# Patient Record
Sex: FEMALE | Race: WHITE | Employment: OTHER | URBAN - METROPOLITAN AREA
[De-identification: names, ages, dates, MRNs, and addresses within clinical notes are randomized per-mention and may not be internally consistent; named-entity substitution may affect disease eponyms.]

---

## 2018-03-26 RX ORDER — LISINOPRIL 2.5 MG/1
2.5 TABLET ORAL DAILY
Qty: 90 TABLET | Refills: 3 | Status: SHIPPED | OUTPATIENT
Start: 2018-03-26 | End: 2019-05-01 | Stop reason: SDUPTHER

## 2018-06-18 ENCOUNTER — OFFICE VISIT (OUTPATIENT)
Dept: CARDIOLOGY CLINIC | Age: 78
End: 2018-06-18
Payer: MEDICARE

## 2018-06-18 VITALS
BODY MASS INDEX: 29.03 KG/M2 | SYSTOLIC BLOOD PRESSURE: 110 MMHG | WEIGHT: 185 LBS | HEIGHT: 67 IN | DIASTOLIC BLOOD PRESSURE: 60 MMHG | HEART RATE: 60 BPM

## 2018-06-18 DIAGNOSIS — I34.0 NON-RHEUMATIC MITRAL REGURGITATION: ICD-10-CM

## 2018-06-18 DIAGNOSIS — E78.5 DYSLIPIDEMIA: ICD-10-CM

## 2018-06-18 DIAGNOSIS — Z78.9 STATIN INTOLERANCE: ICD-10-CM

## 2018-06-18 DIAGNOSIS — Z86.718 HISTORY OF RECURRENT DEEP VEIN THROMBOSIS (DVT): ICD-10-CM

## 2018-06-18 DIAGNOSIS — I10 ESSENTIAL HYPERTENSION: ICD-10-CM

## 2018-06-18 DIAGNOSIS — R07.89 OTHER CHEST PAIN: ICD-10-CM

## 2018-06-18 DIAGNOSIS — E66.3 OVER WEIGHT: ICD-10-CM

## 2018-06-18 DIAGNOSIS — I25.10 CORONARY ARTERY DISEASE INVOLVING NATIVE CORONARY ARTERY OF NATIVE HEART WITHOUT ANGINA PECTORIS: Primary | ICD-10-CM

## 2018-06-18 PROCEDURE — 99214 OFFICE O/P EST MOD 30 MIN: CPT | Performed by: INTERNAL MEDICINE

## 2018-06-18 PROCEDURE — 93000 ELECTROCARDIOGRAM COMPLETE: CPT | Performed by: INTERNAL MEDICINE

## 2018-06-18 RX ORDER — CARVEDILOL 3.12 MG/1
3.12 TABLET ORAL 2 TIMES DAILY WITH MEALS
Qty: 180 TABLET | Refills: 3 | Status: SHIPPED | OUTPATIENT
Start: 2018-06-18 | End: 2019-07-08 | Stop reason: SDUPTHER

## 2018-12-17 ENCOUNTER — OFFICE VISIT (OUTPATIENT)
Dept: CARDIOLOGY CLINIC | Age: 78
End: 2018-12-17
Payer: MEDICARE

## 2018-12-17 VITALS
HEART RATE: 62 BPM | WEIGHT: 188 LBS | BODY MASS INDEX: 29.51 KG/M2 | HEIGHT: 67 IN | DIASTOLIC BLOOD PRESSURE: 60 MMHG | SYSTOLIC BLOOD PRESSURE: 120 MMHG

## 2018-12-17 DIAGNOSIS — I25.10 CORONARY ARTERY DISEASE INVOLVING NATIVE CORONARY ARTERY OF NATIVE HEART WITHOUT ANGINA PECTORIS: Primary | ICD-10-CM

## 2018-12-17 DIAGNOSIS — Z86.718 HISTORY OF RECURRENT DEEP VEIN THROMBOSIS (DVT): ICD-10-CM

## 2018-12-17 DIAGNOSIS — I10 ESSENTIAL HYPERTENSION: ICD-10-CM

## 2018-12-17 DIAGNOSIS — E66.3 OVER WEIGHT: ICD-10-CM

## 2018-12-17 DIAGNOSIS — Z78.9 STATIN INTOLERANCE: ICD-10-CM

## 2018-12-17 DIAGNOSIS — E78.5 DYSLIPIDEMIA: ICD-10-CM

## 2018-12-17 DIAGNOSIS — I34.0 NON-RHEUMATIC MITRAL REGURGITATION: ICD-10-CM

## 2018-12-17 PROCEDURE — 93000 ELECTROCARDIOGRAM COMPLETE: CPT | Performed by: INTERNAL MEDICINE

## 2018-12-17 PROCEDURE — 99215 OFFICE O/P EST HI 40 MIN: CPT | Performed by: INTERNAL MEDICINE

## 2018-12-17 RX ORDER — ICOSAPENT ETHYL 1000 MG/1
2 CAPSULE ORAL 2 TIMES DAILY
Qty: 120 CAPSULE | Refills: 6 | Status: SHIPPED | OUTPATIENT
Start: 2018-12-17 | End: 2018-12-17 | Stop reason: SDUPTHER

## 2018-12-17 RX ORDER — ICOSAPENT ETHYL 1000 MG/1
2 CAPSULE ORAL 2 TIMES DAILY
Qty: 32 CAPSULE | Refills: 0 | COMMUNITY
Start: 2018-12-17 | End: 2020-08-03

## 2019-05-01 RX ORDER — LISINOPRIL 2.5 MG/1
2.5 TABLET ORAL DAILY
Qty: 90 TABLET | Refills: 3 | Status: SHIPPED
Start: 2019-05-01 | End: 2020-04-29 | Stop reason: SDUPTHER

## 2019-05-08 ENCOUNTER — HOSPITAL ENCOUNTER (OUTPATIENT)
Age: 79
Discharge: HOME | End: 2019-05-10

## 2019-05-08 PROCEDURE — 88300 SURGICAL PATH GROSS: CPT

## 2019-07-01 ENCOUNTER — HOSPITAL ENCOUNTER (OUTPATIENT)
Age: 79
Discharge: HOME | End: 2019-07-03
Payer: MEDICARE

## 2019-07-01 LAB
ALBUMIN SERPL-MCNC: 4.4 G/DL (ref 3.5–5.2)
ALP BLD-CCNC: 61 U/L (ref 35–104)
ALT SERPL-CCNC: 14 U/L (ref 0–32)
ANION GAP SERPL CALCULATED.3IONS-SCNC: 13 MMOL/L (ref 7–16)
AST SERPL-CCNC: 19 U/L (ref 0–31)
BASOPHILS ABSOLUTE: 0.05 E9/L (ref 0–0.2)
BASOPHILS RELATIVE PERCENT: 1.2 % (ref 0–2)
BILIRUB SERPL-MCNC: 1 MG/DL (ref 0–1.2)
BUN BLDV-MCNC: 23 MG/DL (ref 8–23)
CALCIUM SERPL-MCNC: 9.5 MG/DL (ref 8.6–10.2)
CHLORIDE BLD-SCNC: 105 MMOL/L (ref 98–107)
CHOLESTEROL, TOTAL: 127 MG/DL (ref 0–199)
CO2: 23 MMOL/L (ref 22–29)
CREAT SERPL-MCNC: 1 MG/DL (ref 0.5–1)
EOSINOPHILS ABSOLUTE: 0.06 E9/L (ref 0.05–0.5)
EOSINOPHILS RELATIVE PERCENT: 1.5 % (ref 0–6)
GFR AFRICAN AMERICAN: >60
GFR NON-AFRICAN AMERICAN: 53 ML/MIN/1.73
GLUCOSE BLD-MCNC: 114 MG/DL (ref 74–99)
HBA1C MFR BLD: 6.2 % (ref 4–5.6)
HCT VFR BLD CALC: 35.5 % (ref 34–48)
HDLC SERPL-MCNC: 37 MG/DL
HEMOGLOBIN: 11.6 G/DL (ref 11.5–15.5)
IMMATURE GRANULOCYTES #: 0.02 E9/L
IMMATURE GRANULOCYTES %: 0.5 % (ref 0–5)
LDL CHOLESTEROL CALCULATED: 71 MG/DL (ref 0–99)
LYMPHOCYTES ABSOLUTE: 1.07 E9/L (ref 1.5–4)
LYMPHOCYTES RELATIVE PERCENT: 26.4 % (ref 20–42)
MCH RBC QN AUTO: 29.2 PG (ref 26–35)
MCHC RBC AUTO-ENTMCNC: 32.7 % (ref 32–34.5)
MCV RBC AUTO: 89.4 FL (ref 80–99.9)
MONOCYTES ABSOLUTE: 0.35 E9/L (ref 0.1–0.95)
MONOCYTES RELATIVE PERCENT: 8.6 % (ref 2–12)
NEUTROPHILS ABSOLUTE: 2.51 E9/L (ref 1.8–7.3)
NEUTROPHILS RELATIVE PERCENT: 61.8 % (ref 43–80)
PDW BLD-RTO: 14.9 FL (ref 11.5–15)
PLATELET # BLD: 131 E9/L (ref 130–450)
PMV BLD AUTO: 11.7 FL (ref 7–12)
POTASSIUM SERPL-SCNC: 4.5 MMOL/L (ref 3.5–5)
RBC # BLD: 3.97 E12/L (ref 3.5–5.5)
SODIUM BLD-SCNC: 141 MMOL/L (ref 132–146)
TOTAL PROTEIN: 7.1 G/DL (ref 6.4–8.3)
TRIGL SERPL-MCNC: 93 MG/DL (ref 0–149)
TSH SERPL DL<=0.05 MIU/L-ACNC: 3.05 UIU/ML (ref 0.27–4.2)
VLDLC SERPL CALC-MCNC: 19 MG/DL
WBC # BLD: 4.1 E9/L (ref 4.5–11.5)

## 2019-07-01 PROCEDURE — 84443 ASSAY THYROID STIM HORMONE: CPT

## 2019-07-01 PROCEDURE — 85025 COMPLETE CBC W/AUTO DIFF WBC: CPT

## 2019-07-01 PROCEDURE — 80061 LIPID PANEL: CPT

## 2019-07-01 PROCEDURE — 83036 HEMOGLOBIN GLYCOSYLATED A1C: CPT

## 2019-07-01 PROCEDURE — 80053 COMPREHEN METABOLIC PANEL: CPT

## 2019-07-08 ENCOUNTER — OFFICE VISIT (OUTPATIENT)
Dept: CARDIOLOGY CLINIC | Age: 79
End: 2019-07-08
Payer: MEDICARE

## 2019-07-08 VITALS
WEIGHT: 192 LBS | SYSTOLIC BLOOD PRESSURE: 136 MMHG | HEIGHT: 67 IN | HEART RATE: 65 BPM | BODY MASS INDEX: 30.13 KG/M2 | DIASTOLIC BLOOD PRESSURE: 60 MMHG

## 2019-07-08 DIAGNOSIS — I10 ESSENTIAL HYPERTENSION: Primary | ICD-10-CM

## 2019-07-08 DIAGNOSIS — R06.02 SOBOE (SHORTNESS OF BREATH ON EXERTION): ICD-10-CM

## 2019-07-08 DIAGNOSIS — I34.0 NON-RHEUMATIC MITRAL REGURGITATION: ICD-10-CM

## 2019-07-08 DIAGNOSIS — E78.5 DYSLIPIDEMIA: ICD-10-CM

## 2019-07-08 DIAGNOSIS — R07.89 OTHER CHEST PAIN: ICD-10-CM

## 2019-07-08 DIAGNOSIS — R01.1 HEART MURMUR: ICD-10-CM

## 2019-07-08 DIAGNOSIS — I25.10 CORONARY ARTERY DISEASE INVOLVING NATIVE CORONARY ARTERY OF NATIVE HEART WITHOUT ANGINA PECTORIS: ICD-10-CM

## 2019-07-08 PROCEDURE — 93000 ELECTROCARDIOGRAM COMPLETE: CPT | Performed by: INTERNAL MEDICINE

## 2019-07-08 PROCEDURE — 99214 OFFICE O/P EST MOD 30 MIN: CPT | Performed by: INTERNAL MEDICINE

## 2019-07-08 RX ORDER — ATORVASTATIN CALCIUM 10 MG/1
TABLET, FILM COATED ORAL
Refills: 0 | COMMUNITY
Start: 2019-04-01 | End: 2019-07-08

## 2019-07-08 RX ORDER — CARVEDILOL 3.12 MG/1
3.12 TABLET ORAL 2 TIMES DAILY WITH MEALS
Qty: 180 TABLET | Refills: 3 | Status: SHIPPED
Start: 2019-07-08 | End: 2020-09-02

## 2019-07-08 RX ORDER — ATORVASTATIN CALCIUM 10 MG/1
10 TABLET, FILM COATED ORAL EVERY OTHER DAY
Qty: 45 TABLET | Refills: 5
Start: 2019-07-08 | End: 2021-04-23

## 2019-07-23 ENCOUNTER — HOSPITAL ENCOUNTER (OUTPATIENT)
Dept: CARDIOLOGY | Age: 79
Discharge: HOME | End: 2019-07-23
Payer: MEDICARE

## 2019-07-23 DIAGNOSIS — I25.10 CORONARY ARTERY DISEASE INVOLVING NATIVE CORONARY ARTERY OF NATIVE HEART WITHOUT ANGINA PECTORIS: ICD-10-CM

## 2019-07-23 DIAGNOSIS — I10 ESSENTIAL HYPERTENSION: ICD-10-CM

## 2019-07-23 DIAGNOSIS — R01.1 HEART MURMUR: ICD-10-CM

## 2019-07-23 DIAGNOSIS — R06.02 SOBOE (SHORTNESS OF BREATH ON EXERTION): ICD-10-CM

## 2019-07-23 LAB
LV EF: 70 %
LVEF MODALITY: NORMAL

## 2019-07-23 PROCEDURE — 93306 TTE W/DOPPLER COMPLETE: CPT

## 2019-07-29 ENCOUNTER — TELEPHONE (OUTPATIENT)
Dept: CARDIOLOGY CLINIC | Age: 79
End: 2019-07-29

## 2019-08-05 RX ORDER — ICOSAPENT ETHYL 1000 MG/1
CAPSULE ORAL
Qty: 120 CAPSULE | Refills: 6 | Status: SHIPPED
Start: 2019-08-05 | End: 2020-08-03

## 2020-03-11 ENCOUNTER — HOSPITAL ENCOUNTER (OUTPATIENT)
Age: 80
Discharge: HOME | End: 2020-03-13
Payer: MEDICARE

## 2020-03-11 LAB
ALBUMIN SERPL-MCNC: 4.6 G/DL (ref 3.5–5.2)
ALP BLD-CCNC: 63 U/L (ref 35–104)
ALT SERPL-CCNC: 20 U/L (ref 0–32)
ANION GAP SERPL CALCULATED.3IONS-SCNC: 13 MMOL/L (ref 7–16)
AST SERPL-CCNC: 23 U/L (ref 0–31)
BILIRUB SERPL-MCNC: 0.9 MG/DL (ref 0–1.2)
BUN BLDV-MCNC: 25 MG/DL (ref 8–23)
CALCIUM SERPL-MCNC: 9.9 MG/DL (ref 8.6–10.2)
CHLORIDE BLD-SCNC: 103 MMOL/L (ref 98–107)
CHOLESTEROL, TOTAL: 229 MG/DL (ref 0–199)
CO2: 24 MMOL/L (ref 22–29)
CREAT SERPL-MCNC: 0.9 MG/DL (ref 0.5–1)
GFR AFRICAN AMERICAN: >60
GFR NON-AFRICAN AMERICAN: >60 ML/MIN/1.73
GLUCOSE BLD-MCNC: 115 MG/DL (ref 74–99)
HBA1C MFR BLD: 5.9 % (ref 4–5.6)
HDLC SERPL-MCNC: 37 MG/DL
LDL CHOLESTEROL CALCULATED: 160 MG/DL (ref 0–99)
POTASSIUM SERPL-SCNC: 5 MMOL/L (ref 3.5–5)
SODIUM BLD-SCNC: 140 MMOL/L (ref 132–146)
TOTAL PROTEIN: 7.5 G/DL (ref 6.4–8.3)
TRIGL SERPL-MCNC: 162 MG/DL (ref 0–149)
VLDLC SERPL CALC-MCNC: 32 MG/DL

## 2020-03-11 PROCEDURE — 80061 LIPID PANEL: CPT

## 2020-03-11 PROCEDURE — 80053 COMPREHEN METABOLIC PANEL: CPT

## 2020-03-11 PROCEDURE — 83036 HEMOGLOBIN GLYCOSYLATED A1C: CPT

## 2020-04-30 RX ORDER — LISINOPRIL 2.5 MG/1
2.5 TABLET ORAL DAILY
Qty: 90 TABLET | Refills: 3 | Status: SHIPPED | OUTPATIENT
Start: 2020-04-30 | End: 2020-05-08 | Stop reason: SDUPTHER

## 2020-05-08 RX ORDER — LISINOPRIL 2.5 MG/1
2.5 TABLET ORAL DAILY
Qty: 90 TABLET | Refills: 3 | Status: SHIPPED
Start: 2020-05-08 | End: 2020-05-08 | Stop reason: SDUPTHER

## 2020-05-08 RX ORDER — LISINOPRIL 2.5 MG/1
2.5 TABLET ORAL DAILY
Qty: 30 TABLET | Refills: 0 | Status: SHIPPED
Start: 2020-05-08 | End: 2020-06-01

## 2020-06-07 RX ORDER — LISINOPRIL 2.5 MG/1
TABLET ORAL
Qty: 90 TABLET | Refills: 3 | Status: SHIPPED
Start: 2020-06-07 | End: 2021-05-19 | Stop reason: SDUPTHER

## 2020-07-29 ENCOUNTER — HOSPITAL ENCOUNTER (OUTPATIENT)
Age: 80
Discharge: HOME | End: 2020-07-31
Payer: MEDICARE

## 2020-07-29 LAB
ALBUMIN SERPL-MCNC: 4.2 G/DL (ref 3.5–5.2)
ALP BLD-CCNC: 63 U/L (ref 35–104)
ALT SERPL-CCNC: 14 U/L (ref 0–32)
ANION GAP SERPL CALCULATED.3IONS-SCNC: 12 MMOL/L (ref 7–16)
AST SERPL-CCNC: 18 U/L (ref 0–31)
BILIRUB SERPL-MCNC: 1.3 MG/DL (ref 0–1.2)
BUN BLDV-MCNC: 30 MG/DL (ref 8–23)
CALCIUM SERPL-MCNC: 9.5 MG/DL (ref 8.6–10.2)
CHLORIDE BLD-SCNC: 102 MMOL/L (ref 98–107)
CHOLESTEROL, TOTAL: 203 MG/DL (ref 0–199)
CO2: 25 MMOL/L (ref 22–29)
CREAT SERPL-MCNC: 1.3 MG/DL (ref 0.5–1)
GFR AFRICAN AMERICAN: 48
GFR NON-AFRICAN AMERICAN: 39 ML/MIN/1.73
GLUCOSE BLD-MCNC: 108 MG/DL (ref 74–99)
HBA1C MFR BLD: 6.1 % (ref 4–5.6)
HDLC SERPL-MCNC: 33 MG/DL
LDL CHOLESTEROL CALCULATED: 139 MG/DL (ref 0–99)
POTASSIUM SERPL-SCNC: 4.6 MMOL/L (ref 3.5–5)
SODIUM BLD-SCNC: 139 MMOL/L (ref 132–146)
TOTAL PROTEIN: 7.1 G/DL (ref 6.4–8.3)
TRIGL SERPL-MCNC: 156 MG/DL (ref 0–149)
VLDLC SERPL CALC-MCNC: 31 MG/DL

## 2020-07-29 PROCEDURE — 83036 HEMOGLOBIN GLYCOSYLATED A1C: CPT

## 2020-07-29 PROCEDURE — 80061 LIPID PANEL: CPT

## 2020-07-29 PROCEDURE — 80053 COMPREHEN METABOLIC PANEL: CPT

## 2020-08-03 ENCOUNTER — OFFICE VISIT (OUTPATIENT)
Dept: CARDIOLOGY CLINIC | Age: 80
End: 2020-08-03
Payer: MEDICARE

## 2020-08-03 VITALS
HEIGHT: 67 IN | DIASTOLIC BLOOD PRESSURE: 60 MMHG | HEART RATE: 60 BPM | BODY MASS INDEX: 28.25 KG/M2 | WEIGHT: 180 LBS | SYSTOLIC BLOOD PRESSURE: 120 MMHG

## 2020-08-03 PROCEDURE — 99214 OFFICE O/P EST MOD 30 MIN: CPT | Performed by: INTERNAL MEDICINE

## 2020-08-03 PROCEDURE — 93000 ELECTROCARDIOGRAM COMPLETE: CPT | Performed by: INTERNAL MEDICINE

## 2020-08-03 NOTE — PROGRESS NOTES
OFFICE VISIT     PRIMARY CARE PHYSICIAN:      Mechelle Chau DO       ALLERGIES / SENSITIVITIES:        Allergies   Allergen Reactions    Tape Julisa Romance Tape]      BANDAIDS          REVIEWED MEDICATIONS:        Current Outpatient Medications:     lisinopril (PRINIVIL;ZESTRIL) 2.5 MG tablet, TAKE 1 TABLET BY MOUTH EVERY DAY, Disp: 90 tablet, Rfl: 3    carvedilol (COREG) 3.125 MG tablet, Take 1 tablet by mouth 2 times daily (with meals), Disp: 180 tablet, Rfl: 3    Docusate Calcium (STOOL SOFTENER PO), Take by mouth as needed, Disp: , Rfl:     nitroGLYCERIN (NITROSTAT) 0.4 MG SL tablet, Place 1 tablet under the tongue every 5 minutes as needed for Chest pain, Disp: 25 tablet, Rfl: 2    metFORMIN (GLUCOPHAGE) 500 MG tablet, Take 500 mg by mouth daily, Disp: , Rfl:     Fesoterodine Fumarate ER (TOVIAZ) 8 MG TB24, Take  by mouth daily. , Disp: , Rfl:     aspirin 325 MG tablet, Take 325 mg by mouth daily. LAST DOSE 5/28/12 , Disp: , Rfl:     atorvastatin (LIPITOR) 10 MG tablet, Take 1 tablet by mouth every other day (Patient not taking: Reported on 8/3/2020), Disp: 45 tablet, Rfl: 5      S: REASON FOR VISIT:       Chief Complaint   Patient presents with    Coronary Artery Disease     6 month follow-up-c/o SOB with activity and feels like she has a lump in her throat for \"quite a while\"-had thyroid ultrasound at PCP and labs last week-no hospitalizations          History of Present Illness:       Office Visit for follow up of CAD, HTN, SOB   [de-identified] yr pt with HX of CAD, HTN came for f/u ivisit   C/o SOB on exertion, chronic, no worse, No PND, sleep on 1 piilow   She had 'lump in throat',  And had U/S of her Thyroid   No hospitalizations or surgeries since last visit   Patient is compliant with all medications   Albert any exertional chest pain    No palpitations, dizzy or syncope.    Active at home   No orthopnea   Try to watch diet          Past Medical History:   Diagnosis Date    Arthritis     OSTEO    CAD (coronary artery disease) 2007    STENTS    Diabetes mellitus ([facility] [address]     DVT [facility] thrombosis) ([facility] X3 1999    HX OF    GERD (gastroesophageal reflux disease)     Glaucoma     BEGINNING    Hyperlipidemia     Hypertension     Hypertension     Overactive bladder     NEURO STIM PART 1 PLACED            Past Surgical History:   Procedure Laterality Date    APPENDECTOMY      BLADDER IRRIGATION/INSTALLATION  june 2012    interstim stimulator put in bladder by dr Darnell Davis  2009   Claudio Peters  12/19/2012    Dr. Nona Coppola, Dr. Dick Yao for PTCA of Circ    CARDIAC CATHETERIZATION  12/19/12    CAD:  70_80% INSTENT STENOSIS OF PROIMAL LEFT CX. PATIENT STENT IN THE PROXIMAL RCA, MILD CAD NORMAL LV EF 60%. PCI WAS DONE.  CARPAL TUNNEL RELEASE      CHOLECYSTECTOMY      COLONOSCOPY      CORONARY ANGIOPLASTY  [id number]    SUCCESSFUL PCI OF CX;  SUCCESSFUL PCI OF RCA.  CORONARY ANGIOPLASTY WITH STENT PLACEMENT      DIAGNOSTIC CARDIAC CATH LAB PROCEDURE  8/18/10    HEART LAB-PROX. LEFT CX INSTENT STENOSIS 40% AND MID OBTUSE MARGINAL BRANCH 40% STENOSIS, PROXIMAL LAD 20% ; PATENT STENT IN THE PROX RCA. NORMAL LV 65% EF.  ENDOSCOPY, COLON, DIAGNOSTIC      FOOT SURGERY      bunions    JOINT REPLACEMENT  1999, 2010    T FRANKIE, BILAT    KNEE SURGERY      bilateral knee surgery    OTHER SURGICAL HISTORY  06/01/2012    placement of peripheral neurostim generator stage II    PTCA  12/19/12    PTCA TO THE FOCAL INSTENT RESTENOSIS IN THE PROXIMAL LEFT CX WITH ANGIOMAX THERAPY. .             Family History   Problem Relation Age of Onset    Cancer Mother           Social History     Tobacco Use    Smoking status: Never Smoker    Smokeless tobacco: Never Used   Substance Use Topics    Alcohol use: No     Comment: drinks decaf tea 1/2 cup         Review of Systems:  Constitutional: negative for fever and chills, or significant weight loss  HEENT: negative for acute visual symptoms or auditory problems, no dysphagia  Respiratory: negative for cough, wheezing, or hemoptysis  Cardiovascular: negative for chest pain, palpitations, and +ve dyspnea  Gastrointestinal: negative for abdominal pain, diarrhea, nausea and vomiting  Endocrine: Negative for polyuria and polydyspsia  Genitourinary:negative for dysuria and hematuria  Derm: negative for rash and skin lesion(s)  Neurological: negative for tingling, numbness, weakness, seizures and tremors  Endocrine: negative for polydipsia and polyuria  Musculoskeletal: negative for pain or tenderness  Psychiatric: negative for anxiety, depression, or suicidal ideations         O:  COMPLETE PHYSICAL EXAM:       /60 (Site: Right Upper Arm, Position: Sitting, Cuff Size: Large Adult)   Pulse 60   Ht 5' 7\" (1.702 m)   Wt 180 lb (81.6 kg)   BMI 28.19 kg/m²       General:   Patient alert, comfortable, no distress. Appears stated age. HEENT:    Pupils equal, no icterus, no nasal drainage, tongue moist.   Neck:              No masses, Thyroid not palpable. No elevated JVD, No carotid bruit. Chest:   Normal configuration, non tender. Lungs:   Clear to auscultation bilaterally, few scattered rhonchi. Cardiovascular:  Regular rhythm, 1/6 systolic murmur, No S3   Abdomen:  Soft, Non tender, Bowel sounds normal, no pulsatile abdominal aorta, no palpable masses. Extremities:  Trace edema. Distal pulses palpable. No cyanosis, no clubbing. Skin:   Good turgor, warm and dry, no cyanosis. Musculoskeletal: No joint swelling or deformity. Neuro:   Cranial nerves grossly intact; No focal neurologic deficit. Psych:   Alert, good mood and effect. REVIEW OF DIAGNOSTIC TESTS:        Electrocardiogram: NSR   Echo -EF 70%, DD1, No VHD          A/P:   ASSESSMENT / PLAN:    Gifty Valerio was seen today for coronary artery disease.     Diagnoses and all orders for this visit:      Coronary artery disease involving native coronary artery of native heart without angina pectoris  - s/p Stent to RCA and Circumflex in 2007, s/p PCI of in-stent restenosis of Circ in 2012, On Aspirin, beta blockers, ACE-I/ARBs, On low dose Statins since Jan 2019 - Lipids better   -     EKG 12 Lead      SOBOE (shortness of breath on exertion) - Chronic, No VHD or acute CHF - LV EF 70%, No VHD. -     Tang Zee MD, Pulmonary, [facility]    Essential hypertension - Controlled  -     EKG 12 Lead      Dyslipidemia  - agreeable for Vascepa due to her high triglycerides (151), CAD and DM; Repatha/Praluent discussed if she can't tolerate Lipitor and LDL not at target     History of recurrent deep vein thrombosis (DVT) - On ASA 325mg DQ     Non-rheumatic mitral regurgitation - Stable     Over weight - Diet, exercise and weight loss discussed. Preventive Cardiology: Low cholesterol diet, regular exercise as tolerate, and gradual weight loss discussed. Monitor BP and heart rates. Above recommendations discussed with her. All questions answered about cardiac diagnoses and cardiac medications. Continue current medications. Compliance with medications and f/u with all physicians discussed. Risk factor modification based on risk profile discussed. Call if any exertional chest pain, short of breath, dizzy or palpitations   Follow up in 6-9 months or earlier if needed.          [facility]  [address] [facility], [address] [facility]  [phone]

## 2020-09-02 RX ORDER — CARVEDILOL 3.12 MG/1
TABLET ORAL
Qty: 180 TABLET | Refills: 3 | Status: SHIPPED
Start: 2020-09-02 | End: 2021-05-12 | Stop reason: SDUPTHER

## 2021-04-23 ENCOUNTER — OFFICE VISIT (OUTPATIENT)
Dept: CARDIOLOGY CLINIC | Age: 81
End: 2021-04-23
Payer: MEDICARE

## 2021-04-23 VITALS
RESPIRATION RATE: 16 BRPM | WEIGHT: 185 LBS | SYSTOLIC BLOOD PRESSURE: 128 MMHG | BODY MASS INDEX: 29.03 KG/M2 | OXYGEN SATURATION: 99 % | DIASTOLIC BLOOD PRESSURE: 70 MMHG | HEART RATE: 60 BPM | HEIGHT: 67 IN

## 2021-04-23 DIAGNOSIS — E78.5 DYSLIPIDEMIA: ICD-10-CM

## 2021-04-23 DIAGNOSIS — I25.10 CORONARY ARTERY DISEASE INVOLVING NATIVE CORONARY ARTERY OF NATIVE HEART WITHOUT ANGINA PECTORIS: Primary | ICD-10-CM

## 2021-04-23 PROBLEM — I34.0 NON-RHEUMATIC MITRAL REGURGITATION: Status: RESOLVED | Noted: 2018-12-17 | Resolved: 2021-04-23

## 2021-04-23 PROCEDURE — 99213 OFFICE O/P EST LOW 20 MIN: CPT | Performed by: INTERNAL MEDICINE

## 2021-04-23 PROCEDURE — 93000 ELECTROCARDIOGRAM COMPLETE: CPT | Performed by: INTERNAL MEDICINE

## 2021-04-23 RX ORDER — PANTOPRAZOLE SODIUM 40 MG/1
TABLET, DELAYED RELEASE ORAL
COMMUNITY
Start: 2021-04-09 | End: 2022-07-13 | Stop reason: SDUPTHER

## 2021-04-23 RX ORDER — PROPRANOLOL HYDROCHLORIDE 20 MG/1
20 TABLET ORAL 2 TIMES DAILY
COMMUNITY
Start: 2021-04-19

## 2021-04-23 NOTE — PROGRESS NOTES
Chief Complaint   Patient presents with    Coronary Artery Disease    Hyperlipidemia       Patient Active Problem List    Diagnosis Date Noted    Statin intolerance 06/18/2018    Essential hypertension 12/19/2016    History of recurrent deep vein thrombosis (DVT) 12/19/2016    Diabetes mellitus ([facility] [address] 07/29/2013    Dyslipidemia 07/29/2013    Coronary artery disease involving native coronary artery of native heart without angina pectoris 12/20/2012       Current Outpatient Medications   Medication Sig Dispense Refill    pantoprazole (PROTONIX) 40 MG tablet TAKE 1 TABLET BY MOUTH EVERY MORNING      propranolol (INDERAL) 20 MG tablet 20 mg 2 times daily       carvedilol (COREG) 3.125 MG tablet TAKE 1 TABLET TWICE A DAY  WITH MEALS 180 tablet 3    lisinopril (PRINIVIL;ZESTRIL) 2.5 MG tablet TAKE 1 TABLET BY MOUTH EVERY DAY 90 tablet 3    Docusate Calcium (STOOL SOFTENER PO) Take by mouth as needed      nitroGLYCERIN (NITROSTAT) 0.4 MG SL tablet Place 1 tablet under the tongue every 5 minutes as needed for Chest pain 25 tablet 2    metFORMIN (GLUCOPHAGE) 500 MG tablet Take 500 mg by mouth daily      Fesoterodine Fumarate ER (TOVIAZ) 8 MG TB24 Take  by mouth daily.  aspirin 325 MG tablet Take 325 mg by mouth daily. LAST DOSE 5/28/12        No current facility-administered medications for this visit. Allergies   Allergen Reactions    Tape [Adhesive Tape]      BANDAIDS       Vitals:    04/23/21 1034   BP: 128/70   Site: Right Upper Arm   Position: Sitting   Cuff Size: Medium Adult   Pulse: 60   Resp: 16   SpO2: 99%   Weight: 185 lb (83.9 kg)   Height: 5' 7\" (1.702 m)       Social History     Socioeconomic History    Marital status:       Spouse name: Not on file    Number of children: Not on file    Years of education: Not on file    Highest education level: Not on file   Occupational History    Not on file   Social Needs    Financial resource strain: Not on file    Food insecurity negative. Physical Exam   /70 (Site: Right Upper Arm, Position: Sitting, Cuff Size: Medium Adult)   Pulse 60   Resp 16   Ht 5' 7\" (1.702 m)   Wt 185 lb (83.9 kg)   SpO2 99%   BMI 28.98 kg/m²   Constitutional: Oriented to person, place, and time. Well-developed and well-nourished. No distress. Head: Normocephalic and atraumatic. Eyes: EOM are normal. Pupils are equal, round, and reactive to light. Neck: Normal range of motion. Neck supple. No hepatojugular reflux and no JVD present. Carotid bruit is not present. No tracheal deviation present. No thyromegaly present. Cardiovascular: Normal rate, regular rhythm, normal heart sounds and intact distal pulses. Exam reveals no gallop and no friction rub. No murmur heard. Pulmonary/Chest: Effort normal and breath sounds normal. No respiratory distress. No wheezes. No rales. No tenderness. Abdominal: Soft. Bowel sounds are normal. No distension and no mass. No tenderness. No rebound and no guarding. Musculoskeletal: Normal range of motion. No edema and no tenderness. Neurological: Alert and oriented to person, place, and time. Skin: Skin is warm and dry. No rash noted. Not diaphoretic. No erythema. Psychiatric: Normal mood and affect. Behavior is normal.     EKG:  normal sinus rhythm, 1st degree AV block, rate 60, , unchanged from previous tracings.     ASSESSMENT AND PLAN:  Patient Active Problem List   Diagnosis    Coronary artery disease involving native coronary artery of native heart without angina pectoris    Diabetes mellitus ([facility] [address]    Dyslipidemia    Essential hypertension    History of recurrent deep vein thrombosis (DVT)    Statin intolerance       Patient with hx of remote PCI in 2007 and again in 2012 for ISR  Last stress 2013 non ischemic  ekg and CV exam no change  No on statin - I have no records of her prior meds, but sounds like she had intolerance to two lipophilic statins - atorvastatin and simvastatin  If she has not been tried on HYDROPHILIC statins - pravastatin or rosuvastatin, she should not be labelled statin intolerant. I would ask PCP to pursue, as he would have records. If she has, or a trial of one them results in similar symptoms, she should be started on Repatha        The patient was educated as to the symptoms that would require a prompt return to our office.   Return visit in 1 year with DR Liam Mackey M.D  [facility]

## 2021-05-04 ENCOUNTER — OFFICE VISIT (OUTPATIENT)
Dept: PULMONOLOGY | Age: 81
End: 2021-05-04
Payer: MEDICARE

## 2021-05-04 VITALS
SYSTOLIC BLOOD PRESSURE: 131 MMHG | DIASTOLIC BLOOD PRESSURE: 64 MMHG | BODY MASS INDEX: 29.03 KG/M2 | HEART RATE: 67 BPM | HEIGHT: 67 IN | TEMPERATURE: 97.1 F | RESPIRATION RATE: 18 BRPM | OXYGEN SATURATION: 95 % | WEIGHT: 185 LBS

## 2021-05-04 DIAGNOSIS — J84.9 ILD (INTERSTITIAL LUNG DISEASE) (HCC): ICD-10-CM

## 2021-05-04 DIAGNOSIS — R06.02 SHORTNESS OF BREATH: Primary | ICD-10-CM

## 2021-05-04 DIAGNOSIS — M06.9 RHEUMATOID ARTHRITIS, INVOLVING UNSPECIFIED SITE, UNSPECIFIED WHETHER RHEUMATOID FACTOR PRESENT (HCC): ICD-10-CM

## 2021-05-04 LAB
EXPIRATORY TIME: NORMAL
FEF 25-75% %PRED-PRE: NORMAL
FEF 25-75% PRED: NORMAL
FEF 25-75%-PRE: NORMAL
FEV1 %PRED-PRE: 126 %
FEV1 PRED: 2.09 L
FEV1/FVC %PRED-PRE: 106 %
FEV1/FVC PRED: 76 %
FEV1/FVC: 81 %
FEV1: 2.64 L
FVC %PRED-PRE: 117 %
FVC PRED: 2.78 L
FVC: 3.27 L
PEF %PRED-PRE: NORMAL
PEF PRED: NORMAL
PEF-PRE: NORMAL

## 2021-05-04 PROCEDURE — 99204 OFFICE O/P NEW MOD 45 MIN: CPT | Performed by: INTERNAL MEDICINE

## 2021-05-04 PROCEDURE — 94010 BREATHING CAPACITY TEST: CPT | Performed by: INTERNAL MEDICINE

## 2021-05-04 PROCEDURE — 99203 OFFICE O/P NEW LOW 30 MIN: CPT | Performed by: INTERNAL MEDICINE

## 2021-05-04 RX ORDER — BUDESONIDE AND FORMOTEROL FUMARATE DIHYDRATE 80; 4.5 UG/1; UG/1
2 AEROSOL RESPIRATORY (INHALATION) 2 TIMES DAILY
Qty: 1 INHALER | Refills: 3 | Status: SHIPPED
Start: 2021-05-04 | End: 2021-08-30 | Stop reason: SDUPTHER

## 2021-05-04 ASSESSMENT — PULMONARY FUNCTION TESTS
FEV1: 2.64
FEV1_PERCENT_PREDICTED_PRE: 126
FVC_PREDICTED: 2.78
FEV1/FVC_PREDICTED: 76
FVC_PERCENT_PREDICTED_PRE: 117
FEV1_PREDICTED: 2.09
FEV1/FVC_PERCENT_PREDICTED_PRE: 106
FEV1/FVC: 81
FVC: 3.27

## 2021-05-04 NOTE — PROGRESS NOTES
Department of Internal Medicine  Division of Pulmonary, Critical Care & Sleep Medicine  Pulmonary [address] [facility]                                             Pulmonary Clinic Consult     I had the pleasure of seeing  Neymar Page in the [facility] regarding their  SOB     Chief Complaint   Patient presents with    New Patient    Shortness of Breath       HISTORY OF PRESENT ILLNESS:    Neymar Page is a 79y.o. year old  Who never smoke and she had some second hand smoking from husbands     The Patient comes in with SOB that has been going on the 12 months  Associated with cough .,He/She  states that it get worse with exercise or walking long distance and he can walk less than 1/2 block And g flight of stairs before get short winded    She  has occasional cough ,she states when she lay down she has burning  sensation     She ambulated in the clinic she was start having chest tightness and      She work as medical assistant       ALLERGIES:    Allergies   Allergen Reactions    Tape Consuella Champagnmadhu Tape]      BANDAIDS       PAST MEDICAL HISTORY:       Diagnosis Date    Arthritis     OSTEO    CAD (coronary artery disease) 2007    STENTS    Diabetes mellitus ([facility] [address]     DVT (deep venous thrombosis) ([facility] [address] X3 1999    HX OF    GERD (gastroesophageal reflux disease)     Glaucoma     BEGINNING    Hyperlipidemia     Hypertension     Hypertension     Overactive bladder     NEURO STIM PART 1 PLACED       MEDICATIONS:   Current Outpatient Medications   Medication Sig Dispense Refill    pantoprazole (PROTONIX) 40 MG tablet TAKE 1 TABLET BY MOUTH EVERY MORNING      propranolol (INDERAL) 20 MG tablet 20 mg 2 times daily       carvedilol (COREG) 3.125 MG tablet TAKE 1 TABLET TWICE A DAY  WITH MEALS 180 tablet 3    lisinopril (PRINIVIL;ZESTRIL) 2.5 MG tablet TAKE 1 TABLET BY MOUTH EVERY DAY 90 tablet 3    Docusate Calcium (STOOL SOFTENER PO) Take by mouth as needed      nitroGLYCERIN (NITROSTAT) 0.4 MG SL tablet Place 1 tablet under the tongue every 5 minutes as needed for Chest pain 25 tablet 2    metFORMIN (GLUCOPHAGE) 500 MG tablet Take 500 mg by mouth daily      Fesoterodine Fumarate ER (TOVIAZ) 8 MG TB24 Take  by mouth daily.  aspirin 325 MG tablet Take 325 mg by mouth daily. LAST DOSE 5/28/12        No current facility-administered medications for this visit. SOCIAL AND OCCUPATIONAL HEALTH:  Social History     Tobacco Use   Smoking Status Never Smoker   Smokeless Tobacco Never Used     TB :  Pets two dog   Industrial exposure:no  Birds :no       SURGICAL HISTORY:   Past Surgical History:   Procedure Laterality Date    APPENDECTOMY      BLADDER IRRIGATION/INSTALLATION  june 2012    interstim stimulator put in bladder by dr Joelle Angel  2009   Katrinka Severin  12/19/2012    Dr. Brenda Davidson, Dr. Kash Calderon for PTCA of Circ    CARDIAC CATHETERIZATION  12/19/12    CAD:  70_80% INSTENT STENOSIS OF PROIMAL LEFT CX. PATIENT STENT IN THE PROXIMAL RCA, MILD CAD NORMAL LV EF 60%. PCI WAS DONE.  CARPAL TUNNEL RELEASE      CHOLECYSTECTOMY      COLONOSCOPY      CORONARY ANGIOPLASTY  [id number]    SUCCESSFUL PCI OF CX;  SUCCESSFUL PCI OF RCA.  CORONARY ANGIOPLASTY WITH STENT PLACEMENT      DIAGNOSTIC CARDIAC CATH LAB PROCEDURE  8/18/10    HEART LAB-PROX. LEFT CX INSTENT STENOSIS 40% AND MID OBTUSE MARGINAL BRANCH 40% STENOSIS, PROXIMAL LAD 20% ; PATENT STENT IN THE PROX RCA. NORMAL LV 65% EF.  ENDOSCOPY, COLON, DIAGNOSTIC      FOOT SURGERY      bunions    JOINT REPLACEMENT  1999, 2010    T FRANKIE, BILAT    KNEE SURGERY      bilateral knee surgery    OTHER SURGICAL HISTORY  06/01/2012    placement of peripheral neurostim generator stage II    PTCA  12/19/12    PTCA TO THE FOCAL INSTENT RESTENOSIS IN THE PROXIMAL LEFT CX WITH ANGIOMAX THERAPY. Philip Gudino           FAMILY HISTORY:   Lung cancer:no   DVT or PE no REVIEW OF SYSTEMS:  Constitutional: General health is good . There has been no weight changes. No fevers, fatigue or weakness. Head: Patient denies any history of trauma, convulsive disorder or syncope. Skin:  Patient denies history of changes in pigmentation, eruptions or pruritus. No easy bruising or bleeding. EENT: no nasal congestion   Cardiovascular ,No chest pain ,No edema ,  Respiration:SOB:+  ,LEDEZMA :++  Gastrointestinal:No GI bleed ,no melena  ,no hematemesis    Musculoskeletal: no joint pain ,no swelling  Neurological:no , syncope. Denies twitching, convulsions, loss of consciousness or memory. Endocrine:  . No history of goiter, exophthalmos or dryness of skin. The patient has no history of diabetes. Hematopoietic:  No history of bleeding disorders or easy bruising. Rheumatic:  No connective tissue disease history or polyarthritis/inflammatory joint disease. PHYSICAL EXAMINATION:  Vitals:    05/04/21 1038   BP: 131/64   Pulse: 67   Resp: 18   Temp: 97.1 °F (36.2 °C)   SpO2: 95%     Constitutional: This is a well developed, well nourished 79y.o. year old female who is alert, oriented, cooperative and in no apparent distress. Head was normocephalic and atraumatic. EENT: Mallampati class :  Extraocular muscles intact. External canals are patent and no discharge was appreciated. Septum was midline,   mucosa was without erythema, exudates or cobblestoning. No thrush was noted. Neck: Supple without thyromegaly. No elevated JVP. Trachea was midline. No carotid bruits were auscultated. Respiratory:rhnchi scattered     Cardiovascular: Regular without murmur, clicks, gallops or rubs. There is no left or right ventricular heave. Pulses:  Carotid, radial and femoral pulses were equally bilaterally. Abdomen: Slightly rounded and soft without organomegaly. No rebound, rigidity or guarding was appreciated. Lymphatic: No lymphadenopathy.   Musculoskeletal: has rheumatoid Extremities:no edema  Skin:  Warm and dry. Good color, turgor and pigmentation. No lesions or scars. Neurological/Psychiatric: The patient's general behavior, level of consciousness, thought content and emotional status is normal.  Cranial nerves II-XII are intact. DATA:      Normal spirometery  IMPRESSION:    1-SOB   2-CAD   3-stage 1 diastolic dysfunction   4-RA               PLAN:      -Ambulated in office and start having SOB and LEDEZMA and chest tightness ,her sat remain 96-98   -PFT looks excellent ,  -I believe her SOB mostly cardiac still with treat GERD and possible Asthma and I will get HRCT   -contact Dr Neymar Watts her cardiology to address possible ACD  - 2 d echo   -eosnophilic count  -Resp allergy prfile  _ D dimer  _CRP  Flu and Pneumovax     Thank you for allowing me to participate in [address] [facility]. I will keep following with you ,should you have any concerns ,please contact me at [phone]     Sincerely,        Manuela Armstrong MD  Pulmonary & Critical Care Medicine     NOTE: This report was transcribed using voice recognition software. Every effort was made to ensure accuracy; however, inadvertent computerized transcription errors may be present.

## 2021-05-04 NOTE — PROGRESS NOTES
Lipid panel in the fall was within normal limits    Continue Vytorin 10-40 mg   Repeat lipid panel in 6 months New pt to see Dr. Tavo Wood today for Shortness of Breath; referral from cardiology. Pt not using any inhalers. Has had both COVID vaccine doses in March. Ambulatory pulse oximetry completed in office; no indications that pt has respiratory problem. Will do HRCT for this pt and review results with virtual visit in the future. Pt needs to follow up for further evaluation of heart issues. Office to arrange HRCT and lab work to be done [employer] given to pt). Script to Symbicort 80mcg 2 puffs twice a day. Instructed pt to rinse mouth after inhaler use. Plan for follow up in office in sept; appt card given and office to mail out appt letter for Chest CT.

## 2021-05-05 ENCOUNTER — TELEPHONE (OUTPATIENT)
Dept: CARDIOLOGY CLINIC | Age: 81
End: 2021-05-05

## 2021-05-05 NOTE — TELEPHONE ENCOUNTER
Dr Reyes Campbell,      Patient called and saw Dr Silke Mccormick and he told patient to see you for a possible echo. May I schedule an echo. Please see his note and advise. Thank Navid Singh. Called patient re:  Above.   Navid Singh

## 2021-05-06 ENCOUNTER — HOSPITAL ENCOUNTER (OUTPATIENT)
Age: 81
Discharge: HOME | End: 2021-05-06
Payer: MEDICARE

## 2021-05-06 DIAGNOSIS — J84.9 ILD (INTERSTITIAL LUNG DISEASE) (HCC): ICD-10-CM

## 2021-05-06 LAB
C-REACTIVE PROTEIN: 0.3 MG/DL (ref 0–0.4)
D DIMER: 598 NG/ML DDU
EOSINOPHILS ABSOLUTE COUNT: 70 /UL (ref 50–250)

## 2021-05-06 PROCEDURE — 82785 ASSAY OF IGE: CPT

## 2021-05-06 PROCEDURE — 85048 AUTOMATED LEUKOCYTE COUNT: CPT

## 2021-05-06 PROCEDURE — 86140 C-REACTIVE PROTEIN: CPT

## 2021-05-06 PROCEDURE — 86003 ALLG SPEC IGE CRUDE XTRC EA: CPT

## 2021-05-06 PROCEDURE — 85378 FIBRIN DEGRADE SEMIQUANT: CPT

## 2021-05-06 PROCEDURE — 36415 COLL VENOUS BLD VENIPUNCTURE: CPT

## 2021-05-10 LAB
2000687N OAK TREE IGE: <0.1 KU/L (ref 0–0.34)
ALLERGEN BERMUDA GRASS IGE: <0.1 KU/L (ref 0–0.34)
ALLERGEN BIRCH IGE: <0.1 KU/L (ref 0–0.34)
ALLERGEN DOG DANDER IGE: <0.1 KU/L (ref 0–0.34)
ALLERGEN GERMAN COCKROACH IGE: <0.1 KU/L (ref 0–0.34)
ALLERGEN HORMODENDRUM IGE: <0.1 KUL/L (ref 0–0.34)
ALLERGEN MOUSE EPITHELIA IGE: <0.1 KU/L (ref 0–0.34)
ALLERGEN PECAN TREE IGE: <0.1 KU/L (ref 0–0.34)
ALLERGEN PIGWEED ROUGH IGE: <0.1 KU/L (ref 0–0.34)
ALLERGEN SHEEP SORREL (W18) IGE: <0.1 KU/L (ref 0–0.34)
ALLERGEN TREE SYCAMORE: <0.1 KU/L (ref 0–0.34)
ALLERGEN WALNUT TREE IGE: <0.1 KU/L (ref 0–0.34)
ALLERGEN WHITE MULBERRY TREE, IGE: <0.1 KU/L (ref 0–0.34)
ALLERGEN, TREE, WHITE ASH IGE: <0.1 KU/L (ref 0–0.34)
ALTERNARIA ALTERNATA: <0.1 KU/L (ref 0–0.34)
ASPERGILLUS FUMIGATUS: <0.1 KU/L (ref 0–0.34)
CAT DANDER ANTIBODY: <0.1 KU/L (ref 0–0.34)
COTTONWOOD TREE: <0.1 KU/L (ref 0–0.34)
D. FARINAE: <0.1 KU/L (ref 0–0.34)
D. PTERONYSSINUS: <0.1 KU/L (ref 0–0.34)
ELM TREE: <0.1 KU/L (ref 0–0.34)
IGE: <1 IU/ML
MAPLE/BOXELDER TREE: <0.1 KU/L (ref 0–0.34)
MOUNTAIN CEDAR TREE: <0.1 KU/L (ref 0–0.34)
MUCOR RACEMOSUS: <0.1 KU/L (ref 0–0.34)
P. NOTATUM: <0.1 KU/L (ref 0–0.34)
RUSSIAN THISTLE: <0.1 KU/L (ref 0–0.34)
SHORT RAGWD(A ARTEMIS.) IGE: <0.1 KU/L (ref 0–0.34)
TIMOTHY GRASS: <0.1 KU/L (ref 0–0.34)

## 2021-05-11 ENCOUNTER — TELEPHONE (OUTPATIENT)
Dept: PULMONOLOGY | Age: 81
End: 2021-05-11

## 2021-05-11 NOTE — TELEPHONE ENCOUNTER
Mailed a letter to patient informing her that her CT Chest High Resolution is scheduled for 6-10-21 at 11:30 am at [address] [facility],[address].  She must arrive by 11:00 am. There is no prep for this test

## 2021-05-12 ENCOUNTER — OFFICE VISIT (OUTPATIENT)
Dept: CARDIOLOGY CLINIC | Age: 81
End: 2021-05-12
Payer: MEDICARE

## 2021-05-12 VITALS
HEIGHT: 67 IN | DIASTOLIC BLOOD PRESSURE: 68 MMHG | SYSTOLIC BLOOD PRESSURE: 120 MMHG | OXYGEN SATURATION: 97 % | HEART RATE: 67 BPM | BODY MASS INDEX: 28.88 KG/M2 | WEIGHT: 184 LBS

## 2021-05-12 DIAGNOSIS — I34.0 NON-RHEUMATIC MITRAL REGURGITATION: ICD-10-CM

## 2021-05-12 DIAGNOSIS — E78.5 DYSLIPIDEMIA: ICD-10-CM

## 2021-05-12 DIAGNOSIS — I25.10 CORONARY ARTERY DISEASE INVOLVING NATIVE CORONARY ARTERY OF NATIVE HEART WITHOUT ANGINA PECTORIS: Primary | ICD-10-CM

## 2021-05-12 DIAGNOSIS — M54.9 CHRONIC UPPER BACK PAIN: ICD-10-CM

## 2021-05-12 DIAGNOSIS — I10 ESSENTIAL HYPERTENSION: ICD-10-CM

## 2021-05-12 DIAGNOSIS — G89.29 CHRONIC UPPER BACK PAIN: ICD-10-CM

## 2021-05-12 DIAGNOSIS — R07.89 CHEST TIGHTNESS: ICD-10-CM

## 2021-05-12 DIAGNOSIS — R06.02 SOBOE (SHORTNESS OF BREATH ON EXERTION): ICD-10-CM

## 2021-05-12 PROCEDURE — 99214 OFFICE O/P EST MOD 30 MIN: CPT | Performed by: INTERNAL MEDICINE

## 2021-05-12 RX ORDER — PRAVASTATIN SODIUM 10 MG
10 TABLET ORAL DAILY
Qty: 30 TABLET | Refills: 6 | Status: SHIPPED
Start: 2021-05-12 | End: 2022-07-13

## 2021-05-12 NOTE — PROGRESS NOTES
OFFICE VISIT     PRIMARY CARE PHYSICIAN:      Robel Coyle DO       ALLERGIES / SENSITIVITIES:        Allergies   Allergen Reactions    Tape Lynsey Kelsi Tape]      BANDAIDS          REVIEWED MEDICATIONS:        Current Outpatient Medications:     pravastatin (PRAVACHOL) 10 MG tablet, Take 1 tablet by mouth daily, Disp: 30 tablet, Rfl: 6    budesonide-formoterol (SYMBICORT) 80-4.5 MCG/ACT AERO, Inhale 2 puffs into the lungs 2 times daily, Disp: 1 Inhaler, Rfl: 3    pantoprazole (PROTONIX) 40 MG tablet, TAKE 1 TABLET BY MOUTH EVERY MORNING, Disp: , Rfl:     propranolol (INDERAL) 20 MG tablet, 20 mg 2 times daily , Disp: , Rfl:     lisinopril (PRINIVIL;ZESTRIL) 2.5 MG tablet, TAKE 1 TABLET BY MOUTH EVERY DAY, Disp: 90 tablet, Rfl: 3    Docusate Calcium (STOOL SOFTENER PO), Take by mouth as needed, Disp: , Rfl:     nitroGLYCERIN (NITROSTAT) 0.4 MG SL tablet, Place 1 tablet under the tongue every 5 minutes as needed for Chest pain, Disp: 25 tablet, Rfl: 2    metFORMIN (GLUCOPHAGE) 500 MG tablet, Take 500 mg by mouth daily, Disp: , Rfl:     Fesoterodine Fumarate ER (TOVIAZ) 8 MG TB24, Take  by mouth daily. , Disp: , Rfl:     aspirin 325 MG tablet, Take 325 mg by mouth daily. LAST DOSE 5/28/12 , Disp: , Rfl:       S: REASON FOR VISIT:       Chief Complaint   Patient presents with    Coronary Artery Disease     Talk re:  2-D .   Per Dr Lori Lemus recommeding a nuclear stress test           History of Present Illness:       Office Visit for follow up of CAD, HTN, SOB   79 yr old Rachel Grider with history of CAD s/p PCI, HTN came for CP and SOB   Started On Propranolol for her \"tremors\"   Seen by Dr Mimi Neri - who recommended Stress tet and Echo   C/o occ chest pain, more at rest, mid sternal -\"heavyand tight: No radiation, no associated Sx, relieve on its owb   C/o SOBOE,  No PND orthopnea   Got her COVID vaccine, both doses   No hospitalizations or surgeries since last visit   Patient is compliant with all Substance Use Topics    Alcohol use: No     Comment: drinks 1 tea cup/Ice tea         Review of Systems:  Constitutional: negative for fever and chills, or significant weight loss  HEENT: negative for acute visual symptoms or auditory problems, no dysphagia  Respiratory: negative for cough, wheezing, or hemoptysis  Cardiovascular: negative for chest pain, palpitations, and dyspnea  Gastrointestinal: negative for abdominal pain, diarrhea, nausea and vomiting  Endocrine: Negative for polyuria and polydyspsia  Genitourinary:negative for dysuria and hematuria  Derm: negative for rash and skin lesion(s)  Neurological: negative for tingling, numbness, weakness, seizures and tremors  Endocrine: negative for polydipsia and polyuria  Musculoskeletal: negative for pain or tenderness  Psychiatric: negative for anxiety, depression, or suicidal ideations         O:  COMPLETE PHYSICAL EXAM:       /68 (Site: Right Upper Arm, Position: Sitting, Cuff Size: Large Adult)   Pulse 67   Ht 5' 7\" (1.702 m)   Wt 184 lb (83.5 kg)   SpO2 97%   BMI 28.82 kg/m²       General:   Patient alert, comfortable, no distress. Appears stated age. HEENT:    Pupils equal, no icterus, no nasal drainage, tongue moist.   Neck:              No masses, Thyroid not palpable. No elevated JVD, No carotid bruit. Chest:   Normal configuration, non tender. Lungs:   Clear to auscultation bilaterally, few scattered rhonchi. Cardiovascular:  Regular rhythm, 1/6 systolic murmur, No S3, no palpable thrills,    Abdomen:  Soft, Non tender, Bowel sounds normal, no pulsatile abdominal aorta, no palpable masses. Extremities:  No edema. Distal pulses palpable. No cyanosis, no clubbing. Skin:   Good turgor, warm and dry, no cyanosis. Musculoskeletal: No joint swelling or deformity. Neuro:   Cranial nerves grossly intact; No focal neurologic deficit. Psych:   Alert, good mood and effect.      REVIEW OF DIAGNOSTIC TESTS:        Electrocardiogram: NSR     Echo 7/23/2019   Left ventricular size is normal.   Normal LV segmental wall motion, EF 70%. There is doppler evidence of stage I diastolic dysfunction. Left atrium is enlarged. Interatrial septum not well visualized but appears intact. Normal right ventricle structure and function. There is trace eccentric mitral regurgitation. No mitral valve prolapse. Normal aortic valve structure. There is trace aortic regurgitation. Normal tricuspid valve structure. There is trace tricuspid regurgitation, RVSP 23mmhg. Normal aortic root and ascending aorta. No evidence of pericardial effusion. No intracardiac mass. Lexiscan stress 12/20/2017:      1. Electrocardiographically normal regadenoson infusion with a   clinicallynonischemic response. 2. Myocardial perfusion imaging was normal.     3. Overall left ventricular systolic function was normal without   regional wall motion abnormalities. , EF 70%. 4.   Low risk general pharmacologic stress test         A/P:   ASSESSMENT / PLAN:    Demetri was seen today for coronary artery disease. Diagnoses and all orders for this visit:    Coronary artery disease involving native coronary artery of native heart without angina pectoris  - s/p Stent to RCA and Circumflex in 2007, s/p PCI of in-stent restenosis of Circ in 2012, On Aspirin, beta blockers, ACE-I/ARBs - Add low dose Pravastatin, side effects discussed, agreeable. -     NM Cardiac Stress Test Nuclear Imaging; Future  -     Cardiac Stress Test - w/Pharm; Future - can not do treadmill due to tremors, SOB, upper back pain  -     Echo 2D w doppler w color complete; Future    Chest tightness  -     NM Cardiac Stress Test Nuclear Imaging; Future  -     Cardiac Stress Test - w/Pharm; Future    SOBOE (shortness of breath on exertion) - No acute CHF  -     NM Cardiac Stress Test Nuclear Imaging; Future  -     Cardiac Stress Test - w/Pharm;  Future  -     Echo 2D w doppler w color complete; Future    Essential hypertension - Controlled  -     Echo 2D w doppler w color complete; Future    Non-rheumatic mitral regurgitation  -     Echo 2D w doppler w color complete; Future    Chronic upper back pain      Dyslipidemia  - Can not tolerate Lipitor, Zocor, Vascepa. Try Pravastatin     History of recurrent deep vein thrombosis (DVT) - On ASA 325mg DQ      Over weight - Diet, exercise and weight loss discussed. Preventive Cardiology: Low cholesterol diet, regular exercise as tolerate, and gradual weight loss discussed. Other orders  -     pravastatin (PRAVACHOL) 10 MG tablet; Take 1 tablet by mouth daily      Monitor BP and heart rates. Above recommendations discussed with her. All questions answered about cardiac diagnoses and cardiac medications. Continue current medications. Compliance with medications and f/u with all physicians discussed. Risk factor modification based on risk profile discussed. Call if any exertional chest pain, short of breath, dizzy or palpitations   Follow up in 6 weeks or earlier if needed.    Call with stress test results       [facility]  [address], [place], [address] [facility]  [phone]

## 2021-05-13 ENCOUNTER — TELEPHONE (OUTPATIENT)
Dept: CARDIOLOGY | Age: 81
End: 2021-05-13

## 2021-05-19 RX ORDER — LISINOPRIL 2.5 MG/1
2.5 TABLET ORAL DAILY
Qty: 90 TABLET | Refills: 3 | Status: SHIPPED
Start: 2021-05-19 | End: 2022-05-11 | Stop reason: SDUPTHER

## 2021-06-02 ENCOUNTER — TELEPHONE (OUTPATIENT)
Dept: CARDIOLOGY | Age: 81
End: 2021-06-02

## 2021-06-02 NOTE — TELEPHONE ENCOUNTER
Spoke with patient reminder  of appointment on 6/3/21 at 8:30 for a Pharmacological stress test instructions given patient confirmed appointment

## 2021-06-03 ENCOUNTER — HOSPITAL ENCOUNTER (OUTPATIENT)
Dept: CARDIOLOGY | Age: 81
Discharge: HOME | End: 2021-06-03
Payer: MEDICARE

## 2021-06-03 VITALS
DIASTOLIC BLOOD PRESSURE: 89 MMHG | HEART RATE: 53 BPM | WEIGHT: 184 LBS | BODY MASS INDEX: 28.88 KG/M2 | SYSTOLIC BLOOD PRESSURE: 141 MMHG | HEIGHT: 67 IN

## 2021-06-03 DIAGNOSIS — R06.02 SOBOE (SHORTNESS OF BREATH ON EXERTION): ICD-10-CM

## 2021-06-03 DIAGNOSIS — R07.89 CHEST TIGHTNESS: ICD-10-CM

## 2021-06-03 DIAGNOSIS — I25.10 CORONARY ARTERY DISEASE INVOLVING NATIVE CORONARY ARTERY OF NATIVE HEART WITHOUT ANGINA PECTORIS: ICD-10-CM

## 2021-06-03 PROCEDURE — 99999 PR OFFICE/OUTPT VISIT,PROCEDURE ONLY: CPT | Performed by: INTERNAL MEDICINE

## 2021-06-03 PROCEDURE — A9502 TC99M TETROFOSMIN: HCPCS | Performed by: INTERNAL MEDICINE

## 2021-06-03 PROCEDURE — 2580000003 HC RX 258: Performed by: INTERNAL MEDICINE

## 2021-06-03 PROCEDURE — 6360000002 HC RX W HCPCS: Performed by: INTERNAL MEDICINE

## 2021-06-03 PROCEDURE — 78452 HT MUSCLE IMAGE SPECT MULT: CPT

## 2021-06-03 PROCEDURE — 93017 CV STRESS TEST TRACING ONLY: CPT

## 2021-06-03 PROCEDURE — 3430000000 HC RX DIAGNOSTIC RADIOPHARMACEUTICAL: Performed by: INTERNAL MEDICINE

## 2021-06-03 RX ORDER — SODIUM CHLORIDE 0.9 % (FLUSH) 0.9 %
10 SYRINGE (ML) INJECTION PRN
Status: DISCONTINUED | OUTPATIENT
Start: 2021-06-03 | End: 2021-06-04 | Stop reason: HOSPADM

## 2021-06-03 RX ADMIN — TETROFOSMIN 34 MILLICURIE: 0.23 INJECTION, POWDER, LYOPHILIZED, FOR SOLUTION INTRAVENOUS at 09:56

## 2021-06-03 RX ADMIN — REGADENOSON 0.4 MG: 0.08 INJECTION, SOLUTION INTRAVENOUS at 09:56

## 2021-06-03 RX ADMIN — Medication 10 ML: at 09:56

## 2021-06-03 RX ADMIN — Medication 10 ML: at 08:40

## 2021-06-03 RX ADMIN — TETROFOSMIN 10.1 MILLICURIE: 0.23 INJECTION, POWDER, LYOPHILIZED, FOR SOLUTION INTRAVENOUS at 08:40

## 2021-06-03 RX ADMIN — Medication 10 ML: at 09:55

## 2021-06-08 ENCOUNTER — TELEPHONE (OUTPATIENT)
Dept: CARDIOLOGY CLINIC | Age: 81
End: 2021-06-08

## 2021-06-08 NOTE — TELEPHONE ENCOUNTER
Dr. Sandor Espinoza,    Patient had her stress test on 06/03/21. May I call her with results? She is to have her echo on 06/17/21. She is on an annual recall. Thank you.   Kevin Gutierrez

## 2021-06-10 ENCOUNTER — HOSPITAL ENCOUNTER (OUTPATIENT)
Dept: CT IMAGING | Age: 81
Discharge: HOME | End: 2021-06-10
Payer: MEDICARE

## 2021-06-10 DIAGNOSIS — J84.9 ILD (INTERSTITIAL LUNG DISEASE) (HCC): ICD-10-CM

## 2021-06-10 DIAGNOSIS — M06.9 RHEUMATOID ARTHRITIS, INVOLVING UNSPECIFIED SITE, UNSPECIFIED WHETHER RHEUMATOID FACTOR PRESENT (HCC): ICD-10-CM

## 2021-06-10 DIAGNOSIS — R06.02 SHORTNESS OF BREATH: ICD-10-CM

## 2021-06-10 PROCEDURE — 71250 CT THORAX DX C-: CPT

## 2021-07-06 ENCOUNTER — HOSPITAL ENCOUNTER (OUTPATIENT)
Dept: CARDIOLOGY | Age: 81
Discharge: HOME | End: 2021-07-06
Payer: MEDICARE

## 2021-07-06 DIAGNOSIS — I34.0 NON-RHEUMATIC MITRAL REGURGITATION: ICD-10-CM

## 2021-07-06 DIAGNOSIS — R06.02 SOBOE (SHORTNESS OF BREATH ON EXERTION): ICD-10-CM

## 2021-07-06 DIAGNOSIS — I25.10 CORONARY ARTERY DISEASE INVOLVING NATIVE CORONARY ARTERY OF NATIVE HEART WITHOUT ANGINA PECTORIS: ICD-10-CM

## 2021-07-06 DIAGNOSIS — I10 ESSENTIAL HYPERTENSION: ICD-10-CM

## 2021-07-06 LAB
LV EF: 60 %
LVEF MODALITY: NORMAL

## 2021-07-06 PROCEDURE — 93306 TTE W/DOPPLER COMPLETE: CPT

## 2021-07-12 ENCOUNTER — TELEPHONE (OUTPATIENT)
Dept: CARDIOLOGY CLINIC | Age: 81
End: 2021-07-12

## 2021-07-12 NOTE — TELEPHONE ENCOUNTER
Dr. Анна Rosas,    Patient had her echo. May I call her with results? She is on an annual recall. Thank you.   Jason Shaw

## 2021-07-19 ENCOUNTER — HOSPITAL ENCOUNTER (OUTPATIENT)
Dept: GENERAL RADIOLOGY | Age: 81
Discharge: HOME | End: 2021-07-21
Payer: MEDICARE

## 2021-07-19 ENCOUNTER — HOSPITAL ENCOUNTER (OUTPATIENT)
Age: 81
End: 2021-07-19
Payer: MEDICARE

## 2021-07-19 DIAGNOSIS — R06.02 SOB (SHORTNESS OF BREATH): ICD-10-CM

## 2021-07-19 PROCEDURE — 71046 X-RAY EXAM CHEST 2 VIEWS: CPT

## 2021-07-20 ENCOUNTER — HOSPITAL ENCOUNTER (OUTPATIENT)
Dept: CT IMAGING | Age: 81
Discharge: HOME | End: 2021-07-20
Payer: MEDICARE

## 2021-07-20 DIAGNOSIS — I26.99 OTHER PULMONARY EMBOLISM WITHOUT ACUTE COR PULMONALE, UNSPECIFIED CHRONICITY (HCC): ICD-10-CM

## 2021-07-20 PROCEDURE — 71275 CT ANGIOGRAPHY CHEST: CPT

## 2021-07-20 PROCEDURE — 6360000004 HC RX CONTRAST MEDICATION: Performed by: RADIOLOGY

## 2021-07-20 RX ADMIN — IOPAMIDOL 75 ML: 755 INJECTION, SOLUTION INTRAVENOUS at 15:00

## 2021-08-30 DIAGNOSIS — R06.02 SHORTNESS OF BREATH: ICD-10-CM

## 2021-08-30 RX ORDER — BUDESONIDE AND FORMOTEROL FUMARATE DIHYDRATE 80; 4.5 UG/1; UG/1
2 AEROSOL RESPIRATORY (INHALATION) 2 TIMES DAILY
Qty: 1 INHALER | Refills: 6 | Status: SHIPPED
Start: 2021-08-30 | End: 2022-07-13

## 2021-09-02 ENCOUNTER — OFFICE VISIT (OUTPATIENT)
Dept: PULMONOLOGY | Age: 81
End: 2021-09-02
Payer: MEDICARE

## 2021-09-02 ENCOUNTER — TELEPHONE (OUTPATIENT)
Dept: PULMONOLOGY | Age: 81
End: 2021-09-02

## 2021-09-02 VITALS
SYSTOLIC BLOOD PRESSURE: 156 MMHG | HEIGHT: 67 IN | BODY MASS INDEX: 27.31 KG/M2 | HEART RATE: 61 BPM | TEMPERATURE: 97.5 F | RESPIRATION RATE: 18 BRPM | WEIGHT: 174 LBS | DIASTOLIC BLOOD PRESSURE: 71 MMHG | OXYGEN SATURATION: 94 %

## 2021-09-02 PROCEDURE — 99213 OFFICE O/P EST LOW 20 MIN: CPT | Performed by: INTERNAL MEDICINE

## 2021-09-02 PROCEDURE — 99214 OFFICE O/P EST MOD 30 MIN: CPT | Performed by: INTERNAL MEDICINE

## 2021-09-02 NOTE — PROGRESS NOTES
Department of Internal Medicine  Division of Pulmonary, Critical Care & Sleep Medicine  Pulmonary [address] [facility]                                             Pulmonary Clinic Consult     I had the pleasure of seeing  Grace Velazquez in the [facility] regarding their  SOB     Chief Complaint   Patient presents with    Follow-up       HISTORY OF PRESENT ILLNESS:    Grace Velazquez is a 79y.o. year old  Who never smoke and she had some second hand smoking from husbands     The Patient comes in with SOB that has been going on the 12 months  Associated with cough .,He/She  states that it get worse with exercise or walking long distance and he can walk less than 1/2 block And g flight of stairs before get short winded    She  has occasional cough ,she states when she lay down she has burning  sensation     She ambulated in the clinic she was start having chest tightness and      She work as medical assistant       ALLERGIES:    Allergies   Allergen Reactions    Tape [facility] Tape]      BANDAIDS       PAST MEDICAL HISTORY:       Diagnosis Date    Arthritis     OSTEO    CAD (coronary artery disease) 2007    STENTS    Diabetes mellitus ([address]     DVT (deep venous thrombosis) ([facility] [address] X3 1999    HX OF    GERD (gastroesophageal reflux disease)     Glaucoma     BEGINNING    Hyperlipidemia     Hypertension     Hypertension     Overactive bladder     NEURO STIM PART 1 PLACED       MEDICATIONS:   Current Outpatient Medications   Medication Sig Dispense Refill    budesonide-formoterol (SYMBICORT) 80-4.5 MCG/ACT AERO Inhale 2 puffs into the lungs 2 times daily 1 Inhaler 6    lisinopril (PRINIVIL;ZESTRIL) 2.5 MG tablet Take 1 tablet by mouth daily 90 tablet 3    pravastatin (PRAVACHOL) 10 MG tablet Take 1 tablet by mouth daily 30 tablet 6    pantoprazole (PROTONIX) 40 MG tablet TAKE 1 TABLET BY MOUTH EVERY MORNING      propranolol (INDERAL) 20 MG tablet 20 mg 2 times daily  Docusate Calcium (STOOL SOFTENER PO) Take by mouth as needed      nitroGLYCERIN (NITROSTAT) 0.4 MG SL tablet Place 1 tablet under the tongue every 5 minutes as needed for Chest pain 25 tablet 2    metFORMIN (GLUCOPHAGE) 500 MG tablet Take 500 mg by mouth daily      Fesoterodine Fumarate ER (TOVIAZ) 8 MG TB24 Take  by mouth daily.  aspirin 325 MG tablet Take 325 mg by mouth daily. LAST DOSE 5/28/12        No current facility-administered medications for this visit. SOCIAL AND OCCUPATIONAL HEALTH:  Social History     Tobacco Use   Smoking Status Never Smoker   Smokeless Tobacco Never Used     TB :  Pets two dog   Industrial exposure:no  Birds :no       SURGICAL HISTORY:   Past Surgical History:   Procedure Laterality Date    APPENDECTOMY      BLADDER IRRIGATION/INSTALLATION  june 2012    interstim stimulator put in bladder by dr Leti Banks  2009   Rise Paganini  12/19/2012    Dr. Jennifer Fajardo, Dr. Katarina Dumas for PTCA of Circ    CARDIAC CATHETERIZATION  12/19/12    CAD:  70_80% INSTENT STENOSIS OF PROIMAL LEFT CX. PATIENT STENT IN THE PROXIMAL RCA, MILD CAD NORMAL LV EF 60%. PCI WAS DONE.  CARPAL TUNNEL RELEASE      CHOLECYSTECTOMY      COLONOSCOPY      CORONARY ANGIOPLASTY  [id number]    SUCCESSFUL PCI OF CX;  SUCCESSFUL PCI OF RCA.  CORONARY ANGIOPLASTY WITH STENT PLACEMENT      DIAGNOSTIC CARDIAC CATH LAB PROCEDURE  8/18/10    HEART LAB-PROX. LEFT CX INSTENT STENOSIS 40% AND MID OBTUSE MARGINAL BRANCH 40% STENOSIS, PROXIMAL LAD 20% ; PATENT STENT IN THE PROX RCA. NORMAL LV 65% EF.     ENDOSCOPY, COLON, DIAGNOSTIC      FOOT SURGERY      bunions    JOINT REPLACEMENT  1999, 2010    T FRANKIE, BILAT    KNEE SURGERY      bilateral knee surgery    OTHER SURGICAL HISTORY  06/01/2012    placement of peripheral neurostim generator stage II    PTCA  12/19/12    PTCA TO THE FOCAL INSTENT RESTENOSIS IN THE PROXIMAL LEFT CX WITH ANGIOMAX THERAPY. Matthew Mobley FAMILY HISTORY:   Lung cancer:no   DVT or PE no     REVIEW OF SYSTEMS:  Constitutional: General health is good . There has been no weight changes. No fevers, fatigue or weakness. Head: Patient denies any history of trauma, convulsive disorder or syncope. Skin:  Patient denies history of changes in pigmentation, eruptions or pruritus. No easy bruising or bleeding. EENT: no nasal congestion   Cardiovascular ,No chest pain ,No edema ,  Respiration:SOB:+  ,LEDEZMA :++  Gastrointestinal:No GI bleed ,no melena  ,no hematemesis    Musculoskeletal: no joint pain ,no swelling  Neurological:no , syncope. Denies twitching, convulsions, loss of consciousness or memory. Endocrine:  . No history of goiter, exophthalmos or dryness of skin. The patient has no history of diabetes. Hematopoietic:  No history of bleeding disorders or easy bruising. Rheumatic:  No connective tissue disease history or polyarthritis/inflammatory joint disease. PHYSICAL EXAMINATION:  Vitals:    09/02/21 1121   BP: (!) 156/71   Pulse: 61   Resp: 18   Temp: 97.5 °F (36.4 °C)   SpO2: 94%     Constitutional: This is a well developed, well nourished 79y.o. year old female who is alert, oriented, cooperative and in no apparent distress. Head was normocephalic and atraumatic. EENT: Mallampati class :  Extraocular muscles intact. External canals are patent and no discharge was appreciated. Septum was midline,   mucosa was without erythema, exudates or cobblestoning. No thrush was noted. Neck: Supple without thyromegaly. No elevated JVP. Trachea was midline. No carotid bruits were auscultated. Respiratory:rhnchi scattered     Cardiovascular: Regular without murmur, clicks, gallops or rubs. There is no left or right ventricular heave. Pulses:  Carotid, radial and femoral pulses were equally bilaterally. Abdomen: Slightly rounded and soft without organomegaly.  No rebound, rigidity or guarding was appreciated. Lymphatic: No lymphadenopathy. Musculoskeletal: has rheumatoid   Extremities:no edema  Skin:  Warm and dry. Good color, turgor and pigmentation. No lesions or scars. Neurological/Psychiatric: The patient's general behavior, level of consciousness, thought content and emotional status is normal.  Cranial nerves II-XII are intact. DATA:      Normal spirometery      IMPRESSION:    1-SOB   2-CAD   3-stage 1 diastolic dysfunction   4-RA               PLAN:      -Again Ambulated in office and start having SOB and LEDEZMA and chest tightness ,her sat remain 96-98   -PFT looks excellent ,will do full PFT   Had PE on elquis X 6 months   -I believe her SOB mostly cardiac still with treat GERD and possible Asthma   -contact Dr Moriah Sr her cardiology to address possible ACD  - 2 d echo diastolic dysfunction   -eosnophilic count N  -Resp allergy profile N  _ D dimer  _CRP N  Ct has non specific  finding ,zgg/atelectsis ,if not better work for ILD    Thank you for allowing me to participate in [facility]. I will keep following with you ,should you have any concerns ,please contact me at [phone]     Sincerely,        Donato Stahl MD  Pulmonary & Critical Care Medicine     NOTE: This report was transcribed using voice recognition software. Every effort was made to ensure accuracy; however, inadvertent computerized transcription errors may be present.

## 2021-09-02 NOTE — TELEPHONE ENCOUNTER
Mailed a letter to patient informing her that there PFT is scheduled for 9-15-21 at 1:00 pm at the [facility]. She must arrive by 12:30 pm. She is to have no caffeine for 24 hours prior to test and no resp meds for at least 4 hours prior to test. She must bring in proof of her COVID vaccines.

## 2021-09-15 ENCOUNTER — HOSPITAL ENCOUNTER (OUTPATIENT)
Dept: PULMONOLOGY | Age: 81
Discharge: HOME | End: 2021-09-15
Payer: MEDICARE

## 2021-09-15 PROCEDURE — 94729 DIFFUSING CAPACITY: CPT

## 2021-09-15 PROCEDURE — 94060 EVALUATION OF WHEEZING: CPT

## 2021-09-15 PROCEDURE — 94726 PLETHYSMOGRAPHY LUNG VOLUMES: CPT

## 2021-10-01 PROCEDURE — 94729 DIFFUSING CAPACITY: CPT | Performed by: INTERNAL MEDICINE

## 2021-10-01 PROCEDURE — 94726 PLETHYSMOGRAPHY LUNG VOLUMES: CPT | Performed by: INTERNAL MEDICINE

## 2021-10-01 PROCEDURE — 94060 EVALUATION OF WHEEZING: CPT | Performed by: INTERNAL MEDICINE

## 2022-05-02 ENCOUNTER — TELEPHONE (OUTPATIENT)
Dept: PULMONOLOGY | Age: 82
End: 2022-05-02

## 2022-05-02 NOTE — TELEPHONE ENCOUNTER
Mailed a letter to patient informing her that her CT Chest is scheduled for 6-14-22 at 11:00 am at [address] [facility].  She must arrive by 10:30 am. There is no prep for this test

## 2022-05-11 RX ORDER — LISINOPRIL 2.5 MG/1
2.5 TABLET ORAL DAILY
Qty: 90 TABLET | Refills: 3 | Status: SHIPPED | OUTPATIENT
Start: 2022-05-11

## 2022-06-14 ENCOUNTER — HOSPITAL ENCOUNTER (OUTPATIENT)
Dept: CT IMAGING | Age: 82
Discharge: HOME | End: 2022-06-14
Payer: MEDICARE

## 2022-06-14 PROCEDURE — 71250 CT THORAX DX C-: CPT

## 2022-07-12 ENCOUNTER — TELEPHONE (OUTPATIENT)
Dept: PULMONOLOGY | Age: 82
End: 2022-07-12

## 2022-07-12 NOTE — TELEPHONE ENCOUNTER
Call to pt to follow up with results form recent Chest CT and to arrange follow up with new provider in office. Advised pt to call office back to discuss with RN.

## 2022-07-13 ENCOUNTER — OFFICE VISIT (OUTPATIENT)
Dept: PULMONOLOGY | Age: 82
End: 2022-07-13
Payer: MEDICARE

## 2022-07-13 ENCOUNTER — TELEPHONE (OUTPATIENT)
Dept: PULMONOLOGY | Age: 82
End: 2022-07-13

## 2022-07-13 VITALS
HEIGHT: 67 IN | WEIGHT: 185 LBS | RESPIRATION RATE: 16 BRPM | HEART RATE: 62 BPM | OXYGEN SATURATION: 97 % | BODY MASS INDEX: 29.03 KG/M2 | TEMPERATURE: 97.3 F

## 2022-07-13 DIAGNOSIS — K21.9 CHRONIC GERD: ICD-10-CM

## 2022-07-13 DIAGNOSIS — Z86.711 HISTORY OF PULMONARY EMBOLUS (PE): ICD-10-CM

## 2022-07-13 DIAGNOSIS — K44.9 HIATAL HERNIA: ICD-10-CM

## 2022-07-13 DIAGNOSIS — R06.09 DOE (DYSPNEA ON EXERTION): Primary | Chronic | ICD-10-CM

## 2022-07-13 DIAGNOSIS — J84.9 ILD (INTERSTITIAL LUNG DISEASE) (HCC): ICD-10-CM

## 2022-07-13 DIAGNOSIS — R91.8 PULMONARY NODULES: ICD-10-CM

## 2022-07-13 DIAGNOSIS — R94.2 ABNORMAL PFTS (PULMONARY FUNCTION TESTS): ICD-10-CM

## 2022-07-13 LAB
EXPIRATORY TIME: 6.38 SEC
FEF 25-75% %PRED-PRE: 169 L/SEC
FEF 25-75% PRED: 1.59 L/SEC
FEF 25-75%-PRE: 2.69 L/SEC
FEV1 %PRED-PRE: 114 %
FEV1 PRED: 2.06 L
FEV1/FVC %PRED-PRE: 108 %
FEV1/FVC PRED: 76 %
FEV1/FVC: 82 %
FEV1: 2.35 L
FVC %PRED-PRE: 104 %
FVC PRED: 2.74 L
FVC: 2.85 L
PEF %PRED-PRE: 119 L/SEC
PEF PRED: 5.02 L/SEC
PEF-PRE: 5.99 L/SEC

## 2022-07-13 PROCEDURE — 1123F ACP DISCUSS/DSCN MKR DOCD: CPT | Performed by: INTERNAL MEDICINE

## 2022-07-13 PROCEDURE — 94727 GAS DIL/WSHOT DETER LNG VOL: CPT | Performed by: INTERNAL MEDICINE

## 2022-07-13 PROCEDURE — 94010 BREATHING CAPACITY TEST: CPT | Performed by: INTERNAL MEDICINE

## 2022-07-13 PROCEDURE — 99214 OFFICE O/P EST MOD 30 MIN: CPT | Performed by: INTERNAL MEDICINE

## 2022-07-13 PROCEDURE — 94060 EVALUATION OF WHEEZING: CPT | Performed by: INTERNAL MEDICINE

## 2022-07-13 PROCEDURE — 99213 OFFICE O/P EST LOW 20 MIN: CPT | Performed by: INTERNAL MEDICINE

## 2022-07-13 RX ORDER — CARVEDILOL 3.12 MG/1
1 TABLET ORAL
COMMUNITY
Start: 2020-10-01

## 2022-07-13 RX ORDER — PANTOPRAZOLE SODIUM 40 MG/1
40 TABLET, DELAYED RELEASE ORAL 2 TIMES DAILY
Qty: 30 TABLET | Refills: 5 | Status: SHIPPED | OUTPATIENT
Start: 2022-07-13

## 2022-07-13 RX ORDER — MELOXICAM 7.5 MG/1
TABLET ORAL
COMMUNITY
Start: 2022-07-01

## 2022-07-13 RX ORDER — WARFARIN SODIUM 5 MG/1
TABLET ORAL
COMMUNITY
Start: 2022-06-15 | End: 2022-09-22 | Stop reason: ALTCHOICE

## 2022-07-13 RX ORDER — WARFARIN SODIUM 4 MG/1
TABLET ORAL
COMMUNITY
Start: 2022-06-20 | End: 2022-09-22 | Stop reason: ALTCHOICE

## 2022-07-13 ASSESSMENT — PULMONARY FUNCTION TESTS
FEV1/FVC_PREDICTED: 76
FEV1_PERCENT_PREDICTED_PRE: 114
FEV1_PREDICTED: 2.06
FVC: 2.85
FVC_PERCENT_PREDICTED_PRE: 104
FVC_PREDICTED: 2.74
FEV1/FVC: 82
FEV1/FVC_PERCENT_PREDICTED_PRE: 108
FEV1: 2.35

## 2022-07-13 NOTE — PROGRESS NOTES
10 mos follow up in office today. recent Chest Ct and Spirometry reviewed. Plan for follow up lab work and office visit within the next 6 mos; appt card to be mailed. Pt given scripts for lab work.

## 2022-07-13 NOTE — PATIENT INSTRUCTIONS
Patient Education        Gastroesophageal Reflux Disease (GERD): Care Instructions  Overview     Gastroesophageal reflux disease (GERD) is the backward flow of stomach acid into the esophagus. The esophagus is the tube that leads from your throat to your stomach. A one-way valve prevents the stomach acid from backing up into this tube. But when you have GERD, this valve does not close tightly enough. This can also cause pain and swelling in your esophagus. (This is calledesophagitis.)  If you have mild GERD symptoms including heartburn, you may be able to control the problem with antacids or over-the-counter medicine. You can also make lifestyle changes to help reduce your symptoms. These include changing yourdiet and eating habits, such as not eating late at night and losing weight. Follow-up care is a key part of your treatment and safety. Be sure to make and go to all appointments, and call your doctor if you are having problems. It's also a good idea to know your test results and keep alist of the medicines you take. How can you care for yourself at home?  Take your medicines exactly as prescribed. Call your doctor if you think you are having a problem with your medicine.  Your doctor may recommend over-the-counter medicine. For mild or occasional indigestion, antacids, such as Tums, Gaviscon, Mylanta, or Maalox, may help. Your doctor also may recommend over-the-counter acid reducers, such as famotidine (Pepcid AC), cimetidine (Tagamet HB), or omeprazole (Prilosec). Read and follow all instructions on the label. If you use these medicines often, talk with your doctor.  Change your eating habits. ? It's best to eat several small meals instead of two or three large meals. ? After you eat, wait 2 to 3 hours before you lie down. ? Avoid foods that make your symptoms worse.  These may include chocolate, mint, alcohol, pepper, spicy foods, high-fat foods, or drinks with caffeine in them, such as tea, coffee, heather, or energy drinks. If your symptoms are worse after you eat a certain food, you may want to stop eating it to see if your symptoms get better.  Do not smoke or chew tobacco. Smoking can make GERD worse. If you need help quitting, talk to your doctor about stop-smoking programs and medicines. These can increase your chances of quitting for good.  If you have GERD symptoms at night, raise the head of your bed 6 to 8 inches by putting the frame on blocks or placing a foam wedge under the head of your mattress. (Adding extra pillows does not work.)   Do not wear tight clothing around your middle.  Lose weight if you need to. Losing just 5 to 10 pounds can help. When should you call for help? Call your doctor now or seek immediate medical care if:     You have new or different belly pain.      Your stools are black and tarlike or have streaks of blood. Watch closely for changes in your health, and be sure to contact your doctor if:     Your symptoms have not improved after 2 days.      Food seems to catch in your throat or chest.   Where can you learn more? Go to https://[employer]. org and sign in to your [employer] account. Enter W030 in the [employer] box to learn more about \"Gastroesophageal Reflux Disease (GERD): Care Instructions. \"     If you do not have an account, please click on the \"Sign Up Now\" link. Current as of: September 8, 2021               Content Version: 13.3  © 1917-9796 Healthwise, Incorporated. Care instructions adapted under license by [facility]. If you have questions about a medical condition or this instruction, always ask your healthcare professional. [place] [address] any warranty or liability for your use of this information.

## 2022-07-13 NOTE — TELEPHONE ENCOUNTER
Call to pt to arrange office visit with new provider today. Pt agreeable to 2pm visit to review recent Chest CT and follow up with pulmonary provider.

## 2022-09-22 ENCOUNTER — OFFICE VISIT (OUTPATIENT)
Dept: CARDIOLOGY CLINIC | Age: 82
End: 2022-09-22
Payer: MEDICARE

## 2022-09-22 VITALS
HEART RATE: 61 BPM | DIASTOLIC BLOOD PRESSURE: 58 MMHG | BODY MASS INDEX: 28.88 KG/M2 | SYSTOLIC BLOOD PRESSURE: 124 MMHG | WEIGHT: 184 LBS | RESPIRATION RATE: 16 BRPM | HEIGHT: 67 IN

## 2022-09-22 DIAGNOSIS — Z86.711 HISTORY OF PULMONARY EMBOLUS (PE): ICD-10-CM

## 2022-09-22 DIAGNOSIS — Z86.718 HISTORY OF RECURRENT DEEP VEIN THROMBOSIS (DVT): ICD-10-CM

## 2022-09-22 DIAGNOSIS — I10 ESSENTIAL HYPERTENSION: ICD-10-CM

## 2022-09-22 DIAGNOSIS — Z78.9 STATIN INTOLERANCE: ICD-10-CM

## 2022-09-22 DIAGNOSIS — E78.5 DYSLIPIDEMIA: ICD-10-CM

## 2022-09-22 DIAGNOSIS — I25.10 CORONARY ARTERY DISEASE INVOLVING NATIVE CORONARY ARTERY OF NATIVE HEART WITHOUT ANGINA PECTORIS: Primary | ICD-10-CM

## 2022-09-22 PROCEDURE — 93000 ELECTROCARDIOGRAM COMPLETE: CPT | Performed by: INTERNAL MEDICINE

## 2022-09-22 PROCEDURE — 1123F ACP DISCUSS/DSCN MKR DOCD: CPT | Performed by: INTERNAL MEDICINE

## 2022-09-22 PROCEDURE — 99214 OFFICE O/P EST MOD 30 MIN: CPT | Performed by: INTERNAL MEDICINE

## 2022-09-22 NOTE — PROGRESS NOTES
OFFICE VISIT     PRIMARY CARE PHYSICIAN:      Pito Sepulveda DO       ALLERGIES / SENSITIVITIES:        Allergies   Allergen Reactions    Tape Donnel Sneddon Tape]      BANDAIDS          REVIEWED MEDICATIONS:        Current Outpatient Medications:     pantoprazole (PROTONIX) 40 MG tablet, Take 1 tablet by mouth in the morning and at bedtime Twice daily for 2 weeks and then continue with daily (Patient taking differently: Take 40 mg by mouth daily), Disp: 30 tablet, Rfl: 5    lisinopril (PRINIVIL;ZESTRIL) 2.5 MG tablet, Take 1 tablet by mouth daily, Disp: 90 tablet, Rfl: 3    propranolol (INDERAL) 20 MG tablet, 20 mg 2 times daily , Disp: , Rfl:     metFORMIN (GLUCOPHAGE) 500 MG tablet, Take 500 mg by mouth daily, Disp: , Rfl:     Fesoterodine Fumarate ER 8 MG TB24, Take  by mouth daily. , Disp: , Rfl:     aspirin 325 MG tablet, Take 325 mg by mouth daily. LAST DOSE 5/28/12 , Disp: , Rfl:     meloxicam (MOBIC) 7.5 MG tablet, TAKE 1 TABLET EVERY DAY AS NEEDED (Patient not taking: Reported on 9/22/2022), Disp: , Rfl:     carvedilol (COREG) 3.125 MG tablet, Take 1 tablet by mouth (Patient not taking: Reported on 9/22/2022), Disp: , Rfl:     Docusate Calcium (STOOL SOFTENER PO), Take by mouth as needed (Patient not taking: Reported on 9/22/2022), Disp: , Rfl:     nitroGLYCERIN (NITROSTAT) 0.4 MG SL tablet, Place 1 tablet under the tongue every 5 minutes as needed for Chest pain (Patient not taking: Reported on 9/22/2022), Disp: 25 tablet, Rfl: 2      S: REASON FOR VISIT:       Chief Complaint   Patient presents with    Coronary Artery Disease    Shortness of Breath     Been to the Pulmonologist and had a PE for a year. History of Present Illness:    Office Visit for follow up of CAD, HTN, SOB              79 yr old Daphine Spurling with history of CAD s/p PCI, HTN came for f/u visit   Had Pulmonary Emboli, On Coumadin for 1 year;  Follows with Dr Maria Esther Armstrong for ILD, PE and Pulmoanry nodules   No hospitalizations or surgeries since last visit   Patient is compliant with all medications   Albert any exertional chest pain  C/o mild  SOBOE, nom orthopnea   No palpitations, dizzy or syncope. Active at home   Try to watch diet          Past Medical History:   Diagnosis Date    Arthritis     OSTEO    CAD (coronary artery disease) 2007    STENTS    Diabetes mellitus ([address]     DVT (deep venous thrombosis) (formerly [facility]) X3 1999    HX OF    GERD (gastroesophageal reflux disease)     Glaucoma     BEGINNING    Hyperlipidemia     Hypertension     Hypertension     Overactive bladder     NEURO STIM PART 1 PLACED            Past Surgical History:   Procedure Laterality Date    APPENDECTOMY      BLADDER IRRIGATION/INSTALLATION  june 2012    interstim stimulator put in bladder by dr Elina Norman  2009    Marcella Bijou  12/19/2012    Dr. Shiv Hemphill, Dr. Milo Roblero for PTCA of Circ    CARDIAC CATHETERIZATION  12/19/12    CAD:  70_80% INSTENT STENOSIS OF PROIMAL LEFT CX. PATIENT STENT IN THE PROXIMAL RCA, MILD CAD NORMAL LV EF 60%. PCI WAS DONE. CARPAL TUNNEL RELEASE      CHOLECYSTECTOMY      COLONOSCOPY      CORONARY ANGIOPLASTY  [id number]    SUCCESSFUL PCI OF CX;  SUCCESSFUL PCI OF RCA. CORONARY ANGIOPLASTY WITH STENT PLACEMENT      DIAGNOSTIC CARDIAC CATH LAB PROCEDURE  8/18/10    HEART LAB-PROX. LEFT CX INSTENT STENOSIS 40% AND MID OBTUSE MARGINAL BRANCH 40% STENOSIS, PROXIMAL LAD 20% ; PATENT STENT IN THE PROX RCA. NORMAL LV 65% EF.    ENDOSCOPY, COLON, DIAGNOSTIC      FOOT SURGERY      bunions    JOINT REPLACEMENT  1999, 2010    T FRANKIE, BILAT    KNEE SURGERY      bilateral knee surgery    OTHER SURGICAL HISTORY  06/01/2012    placement of peripheral neurostim generator stage II    PTCA  12/19/12    PTCA TO THE FOCAL INSTENT RESTENOSIS IN THE PROXIMAL LEFT CX WITH ANGIOMAX THERAPY. .             Family History   Problem Relation Age of Onset    Cancer Mother           Social History Tobacco Use    Smoking status: Never    Smokeless tobacco: Never   Substance Use Topics    Alcohol use: No     Comment: drinks 1 tea cup/Ice tea         Review of Systems:    Constitutional: negative for fever and chills, or significant weight loss  HEENT: negative for acute visual symptoms or auditory problems, no dysphagia  Respiratory: negative for cough, wheezing, or hemoptysis  Cardiovascular: negative for chest pain, palpitations, and dyspnea  Gastrointestinal: negative for abdominal pain, diarrhea, melena, nausea and vomiting  Endocrine: Negative for polyuria and polydyspsia  Genitourinary: negative for dysuria and hematuria  Derm: negative for rash and skin lesion(s)  Neurological: negative for tingling, numbness, weakness, seizures  Endocrine: negative for polydipsia and polyuria  Musculoskeletal: negative for pain or tenderness  Psychiatric: negative for anxiety, depression, or suicidal ideations         O:  COMPLETE PHYSICAL EXAM:       BP (!) 124/58   Pulse 61   Resp 16   Ht 5' 7\" (1.702 m)   Wt 184 lb (83.5 kg)   BMI 28.82 kg/m²       General:   Patient alert, comfortable, no distress. Appears stated age. HEENT:    Pupils equal, no icterus; Tongue moist.   Neck:              No masses, Thyroid not palpable. No elevated JVD, No carotid bruit. Chest:   Normal configuration, non tender. Lungs:   Clear to auscultation bilaterally except few scattered rhonchi. Cardiovascular:  Regular rhythm, 1-2/0 systolic murmur, No S3, no palpable thrills. Abdomen:  Soft, Bowel sounds normal, no pulsatile abdominal aorta, no palpable masses. Extremities:  No edema. Distal pulses palpable. No cyanosis, no clubbing. Skin:   Good turgor, warm and dry, no cyanosis. Musculoskeletal: No joint swelling or deformity. Neuro:   Cranial nerves grossly intact; No focal neurologic deficit. Psych:   Alert, good mood and effect.      REVIEW OF DIAGNOSTIC TESTS:        Electrocardiogram: Reviewed    2D Echo: 7/6/2021   Summary   Normal left ventricular chamber size. Normal left ventricular systolic function, LVEF is 60-60%. Stage I diastolic dysfunction. Interatrial septum not well visualized but appears intact. Normal right ventricle structure and function. Normal right atrium. There is trace mitral regurgitation. No mitral valve prolapse. No hemodynamically significant aortic stenosis is present. There is trace aortic regurgitation. There is trace tricuspid regurgitation. Normal aortic root size. No evidence of pericardial effusion. No intra cardiac mass or thrombus. Compared to prior echo, no significant changes noted. CT chest 9/2/2021  Impression   1. Old granulomatous disease. No enlarging or suspicious pulmonary lesion. 2.  Bibasilar chronic interstitial scarring. No pneumonia or acute   cardiopulmonary disease. 3.  Chronic findings include small hiatal hernia and stable partly calcified   splenic artery aneurysm. CTA Chest 7/1/2021  Impression   There is no central pulmonary embolism or aortic dissection. A small nonocclusive filling defect in the distal subsegmental pulmonary   artery concerning for a small subsegmental pulmonary embolism probably   nonacute. Atelectasis and scarring in the lung bases and 4 mm subpleural nodule in the   right lung base without change. Splenic artery aneurysm. ASSESSMENT / PLAN:    Saint Nash was seen today for coronary artery disease and shortness of breath. Diagnoses and all orders for this visit:       Coronary artery disease involving native coronary artery of native heart without angina pectoris  - s/p Stent to RCA and Circumflex in 2007, s/p PCI of in-stent restenosis of Circ in 2012, On Aspirin, beta blockers, ACEi. Statin intoleracne.         -     EKG 12 lead    SOBOE (shortness of breath on exertion) -Chronic, No acute CHF    Essential hypertension - Controlled     History of pulmonary embolus (PE)  - July 2021 - On Coumadin for 1 year     ILD - Follows with Pulmonary,  Dr Abel Fisher    Statin intolerance    History of deep vein thrombosis (DVT)    Splenic artery aneurysm    Non-rheumatic mitral regurgitation     Chronic upper back pain      Dyslipidemia  - Can not tolerate Lipitor, Zocor, Pravastatin, and Vascepa. History of recurrent deep vein thrombosis (DVT) - On ASA 325mg DQ      Over weight - Diet, exercise and weight loss discussed. Preventive Cardiology: Low cholesterol diet, regular exercise as tolerate, and gradual weight loss discussed. Above recommendations discussed. The patient's current medication list, allergies, problem list and results of prior tests (as available) were reviewed at today's visit   All questions answered about cardiac diagnoses and cardiac medications. Continue current medications. Monitor BP and heart rates. Compliance with medications and f/u with all physicians discussed. Risk factor modification based on risk profile discussed. Call if any exertional chest pain, short of breath, dizzy or palpitations. Follow up in 6 months or earlier if needed.          [facility]  [address] [facility], [address] [place] [address]  [phone]

## 2023-01-07 ENCOUNTER — HOSPITAL ENCOUNTER (OUTPATIENT)
Age: 83
Discharge: HOME | End: 2023-01-07

## 2023-01-08 LAB — RHEUMATOID FACTOR: 10 IU/ML (ref 0–13)

## 2023-01-09 ENCOUNTER — OFFICE VISIT (OUTPATIENT)
Dept: PULMONOLOGY | Age: 83
End: 2023-01-09
Payer: MEDICARE

## 2023-01-09 VITALS
OXYGEN SATURATION: 95 % | WEIGHT: 183 LBS | DIASTOLIC BLOOD PRESSURE: 67 MMHG | SYSTOLIC BLOOD PRESSURE: 157 MMHG | RESPIRATION RATE: 18 BRPM | HEIGHT: 67 IN | HEART RATE: 66 BPM | BODY MASS INDEX: 28.72 KG/M2

## 2023-01-09 DIAGNOSIS — K44.9 HIATAL HERNIA: ICD-10-CM

## 2023-01-09 DIAGNOSIS — K21.9 CHRONIC GERD: ICD-10-CM

## 2023-01-09 DIAGNOSIS — R91.8 PULMONARY NODULES: ICD-10-CM

## 2023-01-09 DIAGNOSIS — J84.9 ILD (INTERSTITIAL LUNG DISEASE) (HCC): ICD-10-CM

## 2023-01-09 DIAGNOSIS — R09.89 PULMONARY HYPERINFLATION: ICD-10-CM

## 2023-01-09 DIAGNOSIS — Z86.711 HISTORY OF PULMONARY EMBOLUS (PE): ICD-10-CM

## 2023-01-09 DIAGNOSIS — R06.09 DOE (DYSPNEA ON EXERTION): Primary | ICD-10-CM

## 2023-01-09 LAB
EXPIRATORY TIME: NORMAL
FEF 25-75% %PRED-PRE: NORMAL
FEF 25-75% PRED: NORMAL
FEF 25-75%-PRE: NORMAL
FEV1 %PRED-PRE: 125 %
FEV1 PRED: 2.05 L
FEV1/FVC %PRED-PRE: 105 %
FEV1/FVC PRED: 76 %
FEV1/FVC: 80 %
FEV1: 2.58 L
FVC %PRED-PRE: 118 %
FVC PRED: 2.73 L
FVC: 3.22 L
PEF %PRED-PRE: NORMAL
PEF PRED: NORMAL
PEF-PRE: NORMAL

## 2023-01-09 PROCEDURE — 94010 BREATHING CAPACITY TEST: CPT | Performed by: INTERNAL MEDICINE

## 2023-01-09 ASSESSMENT — PULMONARY FUNCTION TESTS
FVC_PREDICTED: 2.73
FEV1/FVC_PREDICTED: 76
FEV1_PERCENT_PREDICTED_PRE: 125
FEV1/FVC: 80
FVC: 3.22
FVC_PERCENT_PREDICTED_PRE: 118
FEV1: 2.58
FEV1/FVC_PERCENT_PREDICTED_PRE: 105
FEV1_PREDICTED: 2.05

## 2023-01-09 NOTE — PROGRESS NOTES
6 mos follow up in office today; no issues. Pt had labwork completed yesterday and results are still pending. Spirometry completed today and reviewed with pt. Plan for follow up in office in 6 mos; appt card given and lab work results will be reviewed and follow up by phone with any abnormal results.

## 2023-01-09 NOTE — PROGRESS NOTES
[facility]  Department of Pulmonary, Critical Care and Sleep Medicine      Pulmonary & Critical Care Office Note - Follow up      Assessment/Plan     Assessment & Plan     No problem-specific Assessment & Plan notes found for this encounter. Problem List Items Addressed This Visit          Digestive    Chronic GERD       Respiratory    ILD (interstitial lung disease) ([address]    Relevant Orders    Spirometry Without Bronchodilator (Completed)    CT CHEST WO CONTRAST       Other    LEDEZMA (dyspnea on exertion) - Primary (Chronic)    Relevant Orders    Spirometry Without Bronchodilator (Completed)    Hiatal hernia    History of pulmonary embolus (PE)    Relevant Orders    Spirometry Without Bronchodilator (Completed)    CT CHEST WO CONTRAST     Other Visit Diagnoses       Pulmonary hyperinflation        Pulmonary nodules                   Plan:     Patient has stable symptoms except for shortness of breath on exertion. Spirometry done today does reveal evidence of mild hyperinflation, possible air trapping. Prior lung volume study also shows evidence of hyperinflation and air trapping likely causing her shortness of breath on exertion. Trial of LABA/LAMA - Stiolto    CT with reticular changes suggestive of potential interstitial lung disease but likely these are from her GERD / chronic aspiration  Also multiple bilateral tiny calcified granulomas. Repeat CT chest in 1 year. Basic ILD panel with RF, CCP, JAMA -pending    Continue Protonix  Follow-up with GI as outpatient  Aspiration / GERD precautions  Head end of bed elevation. Maintain active and healthy lifestyle with weight reduction  COVID-19 precautions  Recommend yearly Influenza and appropriate pneumonia & COVID vaccinations.     Orders Placed This Encounter   Procedures    CT CHEST WO CONTRAST     Standing Status:   Future     Standing Expiration Date:   1/9/2024     Order Specific Question:   Reason for exam: Answer:   ILD, nodules    Spirometry Without Bronchodilator       Follow up: Return in about 6 months (around 7/9/2023). Immunization History   Administered Date(s) Administered    COVID-19, PFIZER GRAY top, DO NOT Dilute, (age 15 y+), IM, 30 mcg/0.3 mL 08/01/2022    COVID-19, PFIZER PURPLE top, DILUTE for use, (age 15 y+), 30mcg/0.3mL 03/03/2021, 03/24/2021, 09/30/2021    Influenza A ([id number]) Vaccine PF IM 12/03/2009    Influenza, FLUAD, (age 72 y+), Adjuvanted, 0.5mL 09/17/2021    Influenza, FLUZONE (age 72 y+), High Dose, 0.7mL 09/11/2020    Influenza, High Dose (Fluzone 65 yrs and older) 10/13/2016, 09/22/2018, 09/11/2019       Subjective   Patient ID: Claire Martin is a 79 y.o. female  Chief Complaint:   HPI: Patient is a 79 y.o. female is here for followup. She has history of CAD s/p stents, PE, never smoker, HTN, GERD, presents for follow-up. Patient is currently doing well. Denies any shortness of breath but does note shortness of breath with exertion and even walking up to the office here today made her short of breath. Denies any cough, chest pain, fever, chills or any other symptoms. She is trying to stay active, had her grandchildren over the holidays. After her normal PFTs patient's Symbicort was discontinued. Currently she still has dyspnea on exertion with no associated fever, chills, chest pain or cough. Even minimal exertion like walking about 100 feet makes her short of breath and she also reports occasional lightheadedness. When she used to take Symbicort and as needed albuterol she never felt any relief with those medications. She has arthritis in her fingers But denies any finger joint pain or morning stiffness. No rash, no dry eyes or difficulty swallowing. He has known GERD and symptoms worsen when she lies flat. She has history of PE in [address] [place] [address] after knee replacement and small subsegmental PE last year for which she is on Coumadin.   Denies any bleeding complications. She has history of PE and has completed 1 year of Coumadin on 7/21/2022. Currently she is off Coumadin. Pulm Meds: none. Exacerbations: none    Antiplatelet/anticoagulants: ASA    ========================    PFTs done today reviewed. PFT 1/9/2023 7/13/2022 9/15/21   FEV1 2.58 2.35 2.58   FVC 3.22 2.96    TLC  8.25 5.9   DLCO  27.96 14.11     No obstruction. PFTs from July 2022 with evidence of hyperinflation and air trapping. No obstruction, normal diffusion capacity    =========================    Echocardiogram from July 2021 with evidence of grade 1 diastolic dysfunction    HRCT June 2021 -shows multiple pulmonary nodules, calcified granulomas which have been stable. Also evidence of bilateral subpleural and basilar reticular changes. CT PE study from July 2021 with small nonocclusive filling defect in the distal subsegmental pulmonary artery.     ALLERGIES:  Allergies   Allergen Reactions    Tape Neftali Lavelle Tape]      BANDAIDS     SOCIAL HISTORY:   Social History     Tobacco Use    Smoking status: Never    Smokeless tobacco: Never   Vaping Use    Vaping Use: Never used   Substance Use Topics    Alcohol use: No     Comment: drinks 1 tea cup/Ice tea    Drug use: No     MEDS:     Current Outpatient Medications   Medication Sig Dispense Refill    tiotropium-olodaterol (STIOLTO) 2.5-2.5 MCG/ACT AERS Inhale 2 puffs into the lungs daily 1 each 3    meloxicam (MOBIC) 7.5 MG tablet TAKE 1 TABLET EVERY DAY AS NEEDED (Patient not taking: Reported on 9/22/2022)      carvedilol (COREG) 3.125 MG tablet Take 1 tablet by mouth (Patient not taking: Reported on 9/22/2022)      pantoprazole (PROTONIX) 40 MG tablet Take 1 tablet by mouth in the morning and at bedtime Twice daily for 2 weeks and then continue with daily (Patient taking differently: Take 40 mg by mouth daily) 30 tablet 5    lisinopril (PRINIVIL;ZESTRIL) 2.5 MG tablet Take 1 tablet by mouth daily 90 tablet 3    propranolol (INDERAL) 20 MG tablet 20 mg 2 times daily       Docusate Calcium (STOOL SOFTENER PO) Take by mouth as needed (Patient not taking: Reported on 9/22/2022)      nitroGLYCERIN (NITROSTAT) 0.4 MG SL tablet Place 1 tablet under the tongue every 5 minutes as needed for Chest pain (Patient not taking: Reported on 9/22/2022) 25 tablet 2    metFORMIN (GLUCOPHAGE) 500 MG tablet Take 500 mg by mouth daily      Fesoterodine Fumarate ER 8 MG TB24 Take  by mouth daily. aspirin 325 MG tablet Take 325 mg by mouth daily. LAST DOSE 5/28/12        No current facility-administered medications for this visit. Review of Systems: -ve other than specified above. Objective       Vitals:  BP: (!) 157/67, Heart Rate: 66, Resp: 18,  ,  , SpO2: 95 %, Height: 5' 7\" (170.2 cm), Weight: 183 lb (83 kg)    BMI body mass index is 28.66 kg/m². Ideal Body Weight: Ideal body weight: 61.6 kg (135 lb 12.9 oz)  Adjusted ideal body weight: 70.2 kg (154 lb 10.9 oz)  ---------------  Physical Exam:    General: Alert and oriented x 3. No acute distress. Eyes:  Vision - grossly normal, PERRLA  HENT:  Head is atraumatic and normocephalic. Neck is supple, no jugular venous distention. Respiratory:  Lungs are clear to auscultation except for mild basilar inspiratory crackles, respirations are nonlabored, breath sounds are equal.  Cardiovascular:  S1, S2 normal, regular rate and rhythm, no murmur, no pedal edema  Gastrointestinal:  Soft, nontender, nondistended. Normal bowel sounds. No organomegaly  Neurologic:  Awake and alert, cranial nerves 2-12 grossly intact, no focal motor or sensory deficits.      Labs     CBC:   Lab Results   Component Value Date    WBC 4.7 11/01/2022    HGB 11.7 11/01/2022    HCT 36.4 11/01/2022    MCV 92.6 11/01/2022     11/01/2022     BMP:     Chemistry        Component Value Date/Time     11/01/2022 1113    K 5.3 (H) 11/01/2022 1113     11/01/2022 1113    CO2 23 11/01/2022 1113    BUN 29 (H) 11/01/2022 1113 CREATININE 1.3 (H) 11/01/2022 1113        Component Value Date/Time    CALCIUM 9.9 11/01/2022 1113    ALKPHOS 75 11/01/2022 1113    AST 19 11/01/2022 1113    ALT 14 11/01/2022 1113    BILITOT 0.7 11/01/2022 1113          LFTs:   Lab Results   Component Value Date    ALT 14 11/01/2022    AST 19 11/01/2022    ALKPHOS 75 11/01/2022    BILITOT 0.7 11/01/2022    PROT 7.2 11/01/2022     Coags:   Lab Results   Component Value Date    INR 2.4 07/06/2022       Imaging   Reviewed imaging studies personally and findings as below  CT CHEST HIGH RESOLUTION    Result Date: 6/15/2022  EXAMINATION: CT IMAGES OF THE CHEST WITHOUT CONTRAST, HIGH RESOLUTION 6/14/2022 TECHNIQUE: CT of the chest was performed without the administration of intravenous contrast. High resolution CT imaging was performed of the lungs including supine and prone positioning. Multiplanar reformatted images are provided for review. Automated exposure control, iterative reconstruction, and/or weight based adjustment of the mA/kV was utilized to reduce the radiation dose to as low as reasonably achievable. High resolution CT images were performed in the supine inspiration, supine expiration, and prone inspiration positions. COMPARISON: High-resolution CT chest 07/20/2021 and CT chest 08/23/2007 HISTORY: ORDERING SYSTEM PROVIDED HISTORY: Lung nodule < 6cm on CT FINDINGS:    Lungs and pleura: Multiple tiny calcified granulomas within the posterior right lung base and additional scattered tiny calcified granulomas. Both lower lobes show peripheral subpleural interstitial scarring and bibasilar reticular densities persist in the prone projection. The right middle lobe shows ilox-ix-vydx pleural based noncalcified nodules in the 3-4 mm range, stable dating back to 2007, a benign finding. Axial series 7 images 73 and 74 shows several [id number] left lower lobe tiny nodules which are not suspicious. No dominant or suspicious pulmonary lesion.   No pleural effusion or acute pulmonary infiltrate. Heart and mediastinum: Normal heart size. Coronary artery stents. No pericardial effusion and no lymphadenopathy. Small hiatal hernia. Great vessels: Stable dilatation of the ascending thoracic aorta which measures up to 3.9 cm in diameter. The pulmonary artery trunk is upper normal in size. Upper abdomen: No acute disease. 2.6 cm stable splenic artery aneurysm which is partly calcified. Bones: No acute osseous abnormality. 1.  Old granulomatous disease. No enlarging or suspicious pulmonary lesion. 2.  Bibasilar chronic interstitial scarring. No pneumonia or acute cardiopulmonary disease. 3.  Chronic findings include small hiatal hernia and stable partly calcified splenic artery aneurysm.          FEV1   Date Value Ref Range Status   01/09/2023 2.58 L Final     FVC   Date Value Ref Range Status   01/09/2023 3.22 L Final     FEV1/FVC   Date Value Ref Range Status   01/09/2023 80 % Final       Vipin Emmett Curling MD, MS  Pulmonary & [facility]

## 2023-04-25 ENCOUNTER — OFFICE VISIT (OUTPATIENT)
Dept: VASCULAR SURGERY | Age: 83
End: 2023-04-25
Payer: MEDICARE

## 2023-04-25 VITALS — HEIGHT: 67 IN | WEIGHT: 185 LBS | BODY MASS INDEX: 29.03 KG/M2

## 2023-04-25 DIAGNOSIS — I65.23 CAROTID STENOSIS, ASYMPTOMATIC, BILATERAL: Primary | ICD-10-CM

## 2023-04-25 DIAGNOSIS — I72.8 SPLENIC ARTERY ANEURYSM (HCC): ICD-10-CM

## 2023-04-25 PROCEDURE — 1123F ACP DISCUSS/DSCN MKR DOCD: CPT | Performed by: PHYSICIAN ASSISTANT

## 2023-04-25 PROCEDURE — 99204 OFFICE O/P NEW MOD 45 MIN: CPT | Performed by: PHYSICIAN ASSISTANT

## 2023-04-25 RX ORDER — FESOTERODINE FUMARATE 8 MG/1
TABLET, EXTENDED RELEASE ORAL
COMMUNITY

## 2023-04-25 NOTE — PROGRESS NOTES
Reported on 9/22/2022 10/1/20   Historical Provider, MD   pantoprazole (PROTONIX) 40 MG tablet Take 1 tablet by mouth in the morning and at bedtime Twice daily for 2 weeks and then continue with daily  Patient taking differently: Take 40 mg by mouth daily 7/13/22   Chastity Chappell MD   lisinopril (PRINIVIL;ZESTRIL) 2.5 MG tablet Take 1 tablet by mouth daily 5/11/22   Yanci Cates MD     Allergies:  Tape Cindi Dame tape]    Social History     Socioeconomic History    Marital status:       Spouse name: Not on file    Number of children: Not on file    Years of education: Not on file    Highest education level: Not on file   Occupational History    Not on file   Tobacco Use    Smoking status: Never    Smokeless tobacco: Never   Vaping Use    Vaping Use: Never used   Substance and Sexual Activity    Alcohol use: No     Comment: drinks 1 tea cup/Ice tea    Drug use: No    Sexual activity: Not on file   Other Topics Concern    Not on file   Social History Narrative    Not on file     Social Determinants of Health     Financial Resource Strain: Not on file   Food Insecurity: Not on file   Transportation Needs: Not on file   Physical Activity: Not on file   Stress: Not on file   Social Connections: Not on file   Intimate Partner Violence: Not on file   Housing Stability: Not on file        Family History   Problem Relation Age of Onset    Cancer Mother        REVIEW OF SYSTEMS (New symptoms):    Eyes:      Blurred vision:  No [x]/Yes []               Diplopia:   No [x]/Yes []               Vision loss:       No [x]/Yes []   Ears, nose, throat:             Hearing loss:    No [x]/Yes []      Vertigo:   No [x]/Yes []                       Swallowing problem:  No [x]/Yes []               Nose bleeds:   No [x]/Yes []      Voice hoarseness:  No [x]/Yes []  Respiratory:             Cough:   No [x]/Yes []      Pleuritic chest pain:  No [x]/Yes []                        Dyspnea:   No [x]/Yes []      Wheezing:   No

## 2023-04-26 PROBLEM — I65.23 CAROTID STENOSIS, ASYMPTOMATIC, BILATERAL: Status: ACTIVE | Noted: 2023-04-26

## 2023-04-26 PROBLEM — I72.8 SPLENIC ARTERY ANEURYSM (HCC): Status: ACTIVE | Noted: 2023-04-26

## 2023-05-25 ENCOUNTER — TELEPHONE (OUTPATIENT)
Dept: CARDIOLOGY CLINIC | Age: 83
End: 2023-05-25

## 2023-05-25 NOTE — TELEPHONE ENCOUNTER
Patient needs cardiac clearance for Insertion of Bladder stimulator under MAC anesthesia 05/31/23. They are requesting she hold ASA 325mg x7 days, however, instructed her to take ASA 81mg in its place.  Please advise

## 2023-06-05 ENCOUNTER — TELEPHONE (OUTPATIENT)
Dept: PULMONOLOGY | Age: 83
End: 2023-06-05

## 2023-06-05 NOTE — TELEPHONE ENCOUNTER
Mailed a letter to patient informing her that her CT Chest is scheduled for 6-29-23 at 10:30 am at the [facility]. She must arrive by 10:00 am. There is no prep for this test

## 2023-07-10 ENCOUNTER — OFFICE VISIT (OUTPATIENT)
Dept: PULMONOLOGY | Age: 83
End: 2023-07-10
Payer: MEDICARE

## 2023-07-10 VITALS
BODY MASS INDEX: 29.89 KG/M2 | HEIGHT: 66 IN | DIASTOLIC BLOOD PRESSURE: 91 MMHG | HEART RATE: 59 BPM | TEMPERATURE: 97.6 F | OXYGEN SATURATION: 96 % | RESPIRATION RATE: 18 BRPM | SYSTOLIC BLOOD PRESSURE: 183 MMHG | WEIGHT: 186 LBS

## 2023-07-10 DIAGNOSIS — R91.8 PULMONARY NODULES: ICD-10-CM

## 2023-07-10 DIAGNOSIS — R06.09 DOE (DYSPNEA ON EXERTION): Primary | ICD-10-CM

## 2023-07-10 LAB
EXPIRATORY TIME: 7.85 SEC
FEF 25-75% %PRED-PRE: 127 L/SEC
FEF 25-75% PRED: 1.53 L/SEC
FEF 25-75%-PRE: 1.69 L/SEC
FEV1 %PRED-PRE: 125 %
FEV1 PRED: 1.97 L
FEV1/FVC %PRED-PRE: 99 %
FEV1/FVC PRED: 76 %
FEV1/FVC: 76 %
FEV1: 2.47 L
FVC %PRED-PRE: 123 %
FVC PRED: 2.62 L
FVC: 3.25 L
PEF %PRED-PRE: NORMAL
PEF PRED: NORMAL
PEF-PRE: NORMAL

## 2023-07-10 PROCEDURE — 1123F ACP DISCUSS/DSCN MKR DOCD: CPT | Performed by: INTERNAL MEDICINE

## 2023-07-10 PROCEDURE — 3077F SYST BP >= 140 MM HG: CPT | Performed by: INTERNAL MEDICINE

## 2023-07-10 PROCEDURE — 94010 BREATHING CAPACITY TEST: CPT | Performed by: INTERNAL MEDICINE

## 2023-07-10 PROCEDURE — 94726 PLETHYSMOGRAPHY LUNG VOLUMES: CPT | Performed by: INTERNAL MEDICINE

## 2023-07-10 PROCEDURE — 99214 OFFICE O/P EST MOD 30 MIN: CPT | Performed by: INTERNAL MEDICINE

## 2023-07-10 PROCEDURE — 3080F DIAST BP >= 90 MM HG: CPT | Performed by: INTERNAL MEDICINE

## 2023-07-10 ASSESSMENT — PULMONARY FUNCTION TESTS
FEV1/FVC: 76
FVC_PERCENT_PREDICTED_PRE: 123
FEV1_PREDICTED: 1.97
FEV1/FVC_PERCENT_PREDICTED_PRE: 99
FEV1_PERCENT_PREDICTED_PRE: 125
FVC_PREDICTED: 2.62
FEV1: 2.47
FEV1/FVC_PREDICTED: 76
FVC: 3.25

## 2023-07-10 NOTE — PROGRESS NOTES
[address] [facility]  Department of Pulmonary, Critical Care and Sleep Medicine      Pulmonary & Critical Care Office Note - Follow up      Assessment/Plan     Assessment & Plan     No problem-specific Assessment & Plan notes found for this encounter. Problem List Items Addressed This Visit          Other    LEDEZMA (dyspnea on exertion) - Primary (Chronic)    Relevant Orders    Spirometry Without Bronchodilator (Completed)     Other Visit Diagnoses       Pulmonary nodules        Relevant Orders    CT CHEST WO CONTRAST               Plan:     Patient has stable symptoms except for shortness of breath on exertion. Spirometry again shows evidence of mild hyperinflation however patient reports no significant improvement with Symbicort or Stiolto. Advised her to use Stiolto as needed. CT with reticular changes suggestive of potential interstitial lung disease but likely these are from her GERD / chronic aspiration  This is improved from her last CT scan. Also multiple bilateral tiny calcified granulomas. Repeat CT chest in 2 year and if stable we can probably stop scanning her again. Continue Protonix  Follow-up with GI as outpatient  Aspiration / GERD precautions  Head end of bed elevation. Maintain active and healthy lifestyle with weight reduction  COVID-19 precautions  Recommend yearly Influenza and appropriate pneumonia & COVID vaccinations. I reviewed the patients chart including prior labs and images prior to our office visit and we also reviewed them together today. Reviewed treatment plan and answered all questions to satisfaction. Rosalba Juan 39  Minutes of which greater than 50% was spent counseling or coordinating care. My signature verifies the accuracy and relevance of my note, including documentation of information that has been copy pasted/forward, note templates, and Smitha Crawford.     Orders Placed This Encounter   Procedures    CT CHEST WO CONTRAST

## 2023-08-09 ENCOUNTER — OFFICE VISIT (OUTPATIENT)
Dept: CARDIOLOGY CLINIC | Age: 83
End: 2023-08-09
Payer: MEDICARE

## 2023-08-09 VITALS
WEIGHT: 188 LBS | SYSTOLIC BLOOD PRESSURE: 138 MMHG | HEART RATE: 61 BPM | BODY MASS INDEX: 30.22 KG/M2 | HEIGHT: 66 IN | RESPIRATION RATE: 16 BRPM | DIASTOLIC BLOOD PRESSURE: 74 MMHG

## 2023-08-09 DIAGNOSIS — E11.9 CONTROLLED TYPE 2 DIABETES MELLITUS WITHOUT COMPLICATION, WITHOUT LONG-TERM CURRENT USE OF INSULIN (HCC): ICD-10-CM

## 2023-08-09 DIAGNOSIS — I10 ESSENTIAL HYPERTENSION: ICD-10-CM

## 2023-08-09 DIAGNOSIS — Z78.9 STATIN INTOLERANCE: ICD-10-CM

## 2023-08-09 DIAGNOSIS — Z86.711 HISTORY OF PULMONARY EMBOLUS (PE): ICD-10-CM

## 2023-08-09 DIAGNOSIS — I25.10 CORONARY ARTERY DISEASE INVOLVING NATIVE CORONARY ARTERY OF NATIVE HEART WITHOUT ANGINA PECTORIS: Primary | ICD-10-CM

## 2023-08-09 PROCEDURE — 1123F ACP DISCUSS/DSCN MKR DOCD: CPT | Performed by: INTERNAL MEDICINE

## 2023-08-09 PROCEDURE — 93000 ELECTROCARDIOGRAM COMPLETE: CPT | Performed by: INTERNAL MEDICINE

## 2023-08-09 PROCEDURE — 3078F DIAST BP <80 MM HG: CPT | Performed by: INTERNAL MEDICINE

## 2023-08-09 PROCEDURE — 3074F SYST BP LT 130 MM HG: CPT | Performed by: INTERNAL MEDICINE

## 2023-08-09 PROCEDURE — 99214 OFFICE O/P EST MOD 30 MIN: CPT | Performed by: INTERNAL MEDICINE

## 2023-08-09 PROCEDURE — 3044F HG A1C LEVEL LT 7.0%: CPT | Performed by: INTERNAL MEDICINE

## 2023-08-09 NOTE — PROGRESS NOTES
60-60%. Stage I diastolic dysfunction. Interatrial septum not well visualized but appears intact. Normal right ventricle structure and function. Normal right atrium. There is trace mitral regurgitation. No mitral valve prolapse. No hemodynamically significant aortic stenosis is present. There is trace aortic regurgitation. There is trace tricuspid regurgitation. Normal aortic root size. No evidence of pericardial effusion. No intra cardiac mass or thrombus. Compared to prior echo, no significant changes noted. CT chest 9/2/2021  Impression   1. Old granulomatous disease. No enlarging or suspicious pulmonary lesion. 2.  Bibasilar chronic interstitial scarring. No pneumonia or acute   cardiopulmonary disease. 3.  Chronic findings include small hiatal hernia and stable partly calcified   splenic artery aneurysm. CTA Chest 7/1/2021  Impression   There is no central pulmonary embolism or aortic dissection. A small nonocclusive filling defect in the distal subsegmental pulmonary   artery concerning for a small subsegmental pulmonary embolism probably   nonacute. Atelectasis and scarring in the lung bases and 4 mm subpleural nodule in the   right lung base without change. Splenic artery aneurysm. ASSESSMENT / PLAN:    Kit Lara was seen today for coronary artery disease. Diagnoses and all orders for this visit:         Coronary artery disease involving native coronary artery of native heart without angina pectoris  - s/p Stent to RCA and Circumflex in 2007, s/p PCI of in-stent restenosis of Circ in 2012, On Aspirin, beta blockers, ACEi. Statin intoleracne. Add Jardiance, side effects discussed.  Agreeable        -     EKG 12 lead     SOBOE (shortness of breath on exertion) -Chronic, No acute CHF     Essential hypertension - Controlled     Diabetes - per Dr Nelson Mims    History of pulmonary embolus (PE)  - July 2021 - On Coumadin for 1 year

## 2024-01-31 ENCOUNTER — TELEPHONE (OUTPATIENT)
Dept: ADMINISTRATIVE | Age: 84
End: 2024-01-31

## 2024-01-31 NOTE — TELEPHONE ENCOUNTER
Pt's son calling for a HFU apt/timing with Dr. Barrow dx: Stroke, due to her Afib, at [facility] - was discharged on: 1/26/24 - He is having [facility] recs faxed to the [place] office.

## 2024-02-21 ENCOUNTER — OFFICE VISIT (OUTPATIENT)
Dept: CARDIOLOGY CLINIC | Age: 84
End: 2024-02-21

## 2024-02-21 VITALS
HEIGHT: 66 IN | DIASTOLIC BLOOD PRESSURE: 64 MMHG | WEIGHT: 178 LBS | BODY MASS INDEX: 28.61 KG/M2 | RESPIRATION RATE: 16 BRPM | SYSTOLIC BLOOD PRESSURE: 120 MMHG | HEART RATE: 60 BPM

## 2024-02-21 DIAGNOSIS — I10 ESSENTIAL HYPERTENSION: ICD-10-CM

## 2024-02-21 DIAGNOSIS — I25.10 CORONARY ARTERY DISEASE INVOLVING NATIVE CORONARY ARTERY OF NATIVE HEART WITHOUT ANGINA PECTORIS: Primary | ICD-10-CM

## 2024-02-21 DIAGNOSIS — Z86.711 HISTORY OF PULMONARY EMBOLUS (PE): ICD-10-CM

## 2024-02-21 DIAGNOSIS — I48.0 PAROXYSMAL ATRIAL FIBRILLATION (HCC): ICD-10-CM

## 2024-02-21 DIAGNOSIS — Z86.73 HISTORY OF CVA (CEREBROVASCULAR ACCIDENT): ICD-10-CM

## 2024-02-21 RX ORDER — PANTOPRAZOLE SODIUM 40 MG/1
40 TABLET, DELAYED RELEASE ORAL DAILY
Qty: 30 TABLET | Refills: 3 | Status: SHIPPED
Start: 2024-02-21

## 2024-02-21 RX ORDER — APIXABAN 5 MG/1
5 TABLET, FILM COATED ORAL EVERY 12 HOURS
COMMUNITY
Start: 2024-02-13

## 2024-02-21 NOTE — PROGRESS NOTES
Tobacco Use    Smoking status: Never    Smokeless tobacco: Never   Substance Use Topics    Alcohol use: No     Comment: drinks 1 tea cup/Ice tea         Review of Systems:    Constitutional: negative for fever and chills, or significant weight loss  HEENT: no acute visual symptoms, dysphagia  Respiratory: negative for cough or hemoptysis  Cardiovascular: negative for chest pain, palpitations  Gastrointestinal: negative for abdominal pain, nausea  Endocrine: Negative for polyuria and polydyspsia  Genitourinary: negative for dysuria   Derm: negative for rash   Neurological: negative for tingling, numbness, weakness  Endocrine: negative for polyuria  Musculoskeletal: negative for pain or tenderness  Psychiatric: negative for anxiety, depression         O:  COMPLETE PHYSICAL EXAM:       /64   Pulse 60   Resp 16   Ht 1.676 m (5' 6\")   Wt 80.7 kg (178 lb)   BMI 28.73 kg/m²       General:   Patient alert, comfortable, no distress. Appears stated age.   HEENT:    Pupils equal, no icterus; Tongue moist.   Neck:              Thyroid not palpable. No elevated JVD, No carotid bruit.   Chest:   Normal configuration, non tender.   Lungs:   Mostly clear to auscultation bilaterally; few scattered rhonchi.   Cardiovascular:  Regular rhythm, 2/6 systolic murmur, No S3, no palpable thrills.    Abdomen:  Soft, Bowel sounds normal, can not feel pulsatile abdominal aorta   Extremities:  No edema. Distal pulses palpable. No cyanosis   Skin:   Good turgor, warm and dry, no cyanosis.    Musculoskeletal: No joint swelling or deformity.   Neuro:   Cranial nerves grossly intact; No focal neurologic deficit.   Psych:   Alert, good mood and effect.     REVIEW OF DIAGNOSTIC TESTS:        Electrocardiogram: Reviewed      Labs:  Reviewed as available    Echo [facility] - Jan 2024 - EF 59%, mild TR, AR      2D Echo: 7/6/2021   Summary   Normal left ventricular chamber size.   Normal left ventricular systolic function, LVEF is 60-60%.

## 2024-04-02 DIAGNOSIS — I65.23 CAROTID STENOSIS, ASYMPTOMATIC, BILATERAL: Primary | ICD-10-CM

## 2024-04-23 ENCOUNTER — OFFICE VISIT (OUTPATIENT)
Dept: VASCULAR SURGERY | Age: 84
End: 2024-04-23
Payer: MEDICARE

## 2024-04-23 ENCOUNTER — HOSPITAL ENCOUNTER (OUTPATIENT)
Dept: CARDIOLOGY | Age: 84
Discharge: HOME | End: 2024-04-25
Payer: MEDICARE

## 2024-04-23 VITALS — BODY MASS INDEX: 29.05 KG/M2 | WEIGHT: 180 LBS

## 2024-04-23 DIAGNOSIS — I65.23 CAROTID STENOSIS, ASYMPTOMATIC, BILATERAL: ICD-10-CM

## 2024-04-23 DIAGNOSIS — I65.23 CAROTID STENOSIS, ASYMPTOMATIC, BILATERAL: Primary | ICD-10-CM

## 2024-04-23 LAB
VAS LEFT CCA DIST EDV: 22.8 CM/S
VAS LEFT CCA DIST PSV: 73.1 CM/S
VAS LEFT CCA PROX EDV: 15.3 CM/S
VAS LEFT CCA PROX PSV: 76.8 CM/S
VAS LEFT ECA EDV: 24.9 CM/S
VAS LEFT ECA PSV: 220.4 CM/S
VAS LEFT ICA DIST EDV: 27.2 CM/S
VAS LEFT ICA DIST PSV: 78.3 CM/S
VAS LEFT ICA MID EDV: 23.5 CM/S
VAS LEFT ICA MID PSV: 91.1 CM/S
VAS LEFT ICA PROX EDV: 32.5 CM/S
VAS LEFT ICA PROX PSV: 139.2 CM/S
VAS LEFT ICA/CCA PSV: 1.8 NO UNITS
VAS LEFT VERTEBRAL EDV: 0 CM/S
VAS LEFT VERTEBRAL PSV: 36.2 CM/S
VAS RIGHT CCA DIST EDV: 18 CM/S
VAS RIGHT CCA DIST PSV: 95.7 CM/S
VAS RIGHT CCA PROX EDV: 27.1 CM/S
VAS RIGHT CCA PROX PSV: 90.8 CM/S
VAS RIGHT ECA EDV: 7.6 CM/S
VAS RIGHT ECA PSV: 82.8 CM/S
VAS RIGHT ICA DIST EDV: 24.5 CM/S
VAS RIGHT ICA DIST PSV: 89.3 CM/S
VAS RIGHT ICA MID EDV: 21.9 CM/S
VAS RIGHT ICA MID PSV: 64.6 CM/S
VAS RIGHT ICA PROX EDV: 18 CM/S
VAS RIGHT ICA PROX PSV: 64.6 CM/S
VAS RIGHT ICA/CCA PSV: 0.9 NO UNITS
VAS RIGHT VERTEBRAL EDV: 18.6 CM/S
VAS RIGHT VERTEBRAL PSV: 46.1 CM/S

## 2024-04-23 PROCEDURE — 99213 OFFICE O/P EST LOW 20 MIN: CPT | Performed by: SURGERY

## 2024-04-23 PROCEDURE — 1123F ACP DISCUSS/DSCN MKR DOCD: CPT | Performed by: SURGERY

## 2024-04-23 PROCEDURE — 93880 EXTRACRANIAL BILAT STUDY: CPT | Performed by: SURGERY

## 2024-04-23 PROCEDURE — 93880 EXTRACRANIAL BILAT STUDY: CPT

## 2024-04-23 NOTE — PROGRESS NOTES
Vascular Surgery Outpatient Progress Note      Chief Complaint   Patient presents with    Circulatory Problem     Carotid stenosis       HISTORY OF PRESENT ILLNESS:                The patient is a 83 y.o. female who returns for follow-up evaluation of carotid artery stenosis.  The patient is accompanied by her son.  She denies any new problems or new symptoms of stroke since her prior episode that was felt to be due to atrial fibrillation.  She remains on anticoagulation.    Past Medical History:        Diagnosis Date    Arthritis     OSTEO    CAD (coronary artery disease) 01/01/2007    STENTS    ASTRID (cerebral atherosclerosis)     Diabetes mellitus (HCC)     DVT (deep venous thrombosis) ([facility]) X3 1999    HX OF    GERD (gastroesophageal reflux disease)     Glaucoma     BEGINNING    Hyperlipidemia     Hypertension     Hypertension     Overactive bladder     NEURO STIM PART 1 PLACED     Past Surgical History:        Procedure Laterality Date    APPENDECTOMY      BLADDER IRRIGATION/INSTALLATION  june 2012    interstim stimulator put in bladder by dr sharma    BLADDER SUSPENSION  2009    CARDIAC CATHETERIZATION  2011    CARDIAC CATHETERIZATION  12/19/2012    Dr. Barrow [facility], Dr. Martinez for PTCA of Circ    CARDIAC CATHETERIZATION  12/19/12    CAD:  70_80% INSTENT STENOSIS OF PROIMAL LEFT CX.  PATIENT STENT IN THE PROXIMAL RCA, MILD CAD NORMAL LV EF 60%.   PCI WAS DONE.    CARPAL TUNNEL RELEASE      CHOLECYSTECTOMY      COLONOSCOPY      CORONARY ANGIOPLASTY  1939    SUCCESSFUL PCI OF CX;  SUCCESSFUL PCI OF RCA.    CORONARY ANGIOPLASTY WITH STENT PLACEMENT      DIAGNOSTIC CARDIAC CATH LAB PROCEDURE  8/18/10    HEART LAB-PROX. LEFT CX INSTENT STENOSIS 40% AND MID OBTUSE MARGINAL BRANCH 40% STENOSIS, PROXIMAL LAD 20% ; PATENT STENT IN THE PROX RCA.  NORMAL LV 65% EF.    ENDOSCOPY, COLON, DIAGNOSTIC      FOOT SURGERY      bunions    JOINT REPLACEMENT  1999, 2010    T FRANKIE, BILAT    KNEE SURGERY      bilateral knee surgery

## 2024-05-01 ENCOUNTER — APPOINTMENT (OUTPATIENT)
Dept: CT IMAGING | Age: 84
End: 2024-05-01
Payer: MEDICARE

## 2024-05-01 ENCOUNTER — HOSPITAL ENCOUNTER (EMERGENCY)
Age: 84
Discharge: HOME | End: 2024-05-01
Attending: EMERGENCY MEDICINE
Payer: MEDICARE

## 2024-05-01 ENCOUNTER — APPOINTMENT (OUTPATIENT)
Dept: GENERAL RADIOLOGY | Age: 84
End: 2024-05-01
Payer: MEDICARE

## 2024-05-01 VITALS
SYSTOLIC BLOOD PRESSURE: 135 MMHG | WEIGHT: 185 LBS | TEMPERATURE: 97.8 F | OXYGEN SATURATION: 97 % | DIASTOLIC BLOOD PRESSURE: 84 MMHG | HEIGHT: 67 IN | RESPIRATION RATE: 16 BRPM | HEART RATE: 66 BPM | BODY MASS INDEX: 29.03 KG/M2

## 2024-05-01 DIAGNOSIS — R55 SYNCOPE, UNSPECIFIED SYNCOPE TYPE: Primary | ICD-10-CM

## 2024-05-01 LAB
ANION GAP SERPL CALCULATED.3IONS-SCNC: 10 MMOL/L (ref 7–16)
BASOPHILS # BLD: 0.03 K/UL (ref 0–0.2)
BASOPHILS NFR BLD: 1 % (ref 0–2)
BUN SERPL-MCNC: 32 MG/DL (ref 6–23)
CALCIUM SERPL-MCNC: 9 MG/DL (ref 8.6–10.2)
CHLORIDE SERPL-SCNC: 106 MMOL/L (ref 98–107)
CO2 SERPL-SCNC: 25 MMOL/L (ref 22–29)
CREAT SERPL-MCNC: 1.2 MG/DL (ref 0.5–1)
EKG ATRIAL RATE: 55 BPM
EKG Q-T INTERVAL: 414 MS
EKG QRS DURATION: 88 MS
EKG QTC CALCULATION (BAZETT): 420 MS
EKG R AXIS: 5 DEGREES
EKG T AXIS: 27 DEGREES
EKG VENTRICULAR RATE: 62 BPM
EOSINOPHIL # BLD: 0.04 K/UL (ref 0.05–0.5)
EOSINOPHILS RELATIVE PERCENT: 1 % (ref 0–6)
ERYTHROCYTE [DISTWIDTH] IN BLOOD BY AUTOMATED COUNT: 14.6 % (ref 11.5–15)
GFR, ESTIMATED: 46 ML/MIN/1.73M2
GLUCOSE BLD-MCNC: 180 MG/DL (ref 74–99)
GLUCOSE SERPL-MCNC: 174 MG/DL (ref 74–99)
HCT VFR BLD AUTO: 37.1 % (ref 34–48)
HGB BLD-MCNC: 12.5 G/DL (ref 11.5–15.5)
IMM GRANULOCYTES # BLD AUTO: 0.03 K/UL (ref 0–0.58)
IMM GRANULOCYTES NFR BLD: 1 % (ref 0–5)
LYMPHOCYTES NFR BLD: 0.95 K/UL (ref 1.5–4)
LYMPHOCYTES RELATIVE PERCENT: 17 % (ref 20–42)
MCH RBC QN AUTO: 30.3 PG (ref 26–35)
MCHC RBC AUTO-ENTMCNC: 33.7 G/DL (ref 32–34.5)
MCV RBC AUTO: 89.8 FL (ref 80–99.9)
MONOCYTES NFR BLD: 0.35 K/UL (ref 0.1–0.95)
MONOCYTES NFR BLD: 6 % (ref 2–12)
NEUTROPHILS NFR BLD: 76 % (ref 43–80)
NEUTS SEG NFR BLD: 4.37 K/UL (ref 1.8–7.3)
PLATELET # BLD AUTO: 143 K/UL (ref 130–450)
PMV BLD AUTO: 10.5 FL (ref 7–12)
POTASSIUM SERPL-SCNC: 4.7 MMOL/L (ref 3.5–5)
RBC # BLD AUTO: 4.13 M/UL (ref 3.5–5.5)
SODIUM SERPL-SCNC: 141 MMOL/L (ref 132–146)
TROPONIN I SERPL HS-MCNC: 13 NG/L (ref 0–9)
TROPONIN I SERPL HS-MCNC: 15 NG/L (ref 0–9)
WBC OTHER # BLD: 5.8 K/UL (ref 4.5–11.5)

## 2024-05-01 PROCEDURE — 99285 EMERGENCY DEPT VISIT HI MDM: CPT

## 2024-05-01 PROCEDURE — 70450 CT HEAD/BRAIN W/O DYE: CPT

## 2024-05-01 PROCEDURE — 71045 X-RAY EXAM CHEST 1 VIEW: CPT

## 2024-05-01 PROCEDURE — 93005 ELECTROCARDIOGRAM TRACING: CPT | Performed by: EMERGENCY MEDICINE

## 2024-05-01 PROCEDURE — 82962 GLUCOSE BLOOD TEST: CPT

## 2024-05-01 PROCEDURE — 85025 COMPLETE CBC W/AUTO DIFF WBC: CPT

## 2024-05-01 PROCEDURE — 93010 ELECTROCARDIOGRAM REPORT: CPT | Performed by: INTERNAL MEDICINE

## 2024-05-01 PROCEDURE — 80048 BASIC METABOLIC PNL TOTAL CA: CPT

## 2024-05-01 PROCEDURE — 84484 ASSAY OF TROPONIN QUANT: CPT

## 2024-05-01 PROCEDURE — 2580000003 HC RX 258: Performed by: EMERGENCY MEDICINE

## 2024-05-01 RX ORDER — 0.9 % SODIUM CHLORIDE 0.9 %
1000 INTRAVENOUS SOLUTION INTRAVENOUS ONCE
Status: COMPLETED | OUTPATIENT
Start: 2024-05-01 | End: 2024-05-01

## 2024-05-01 RX ADMIN — SODIUM CHLORIDE 1000 ML: 9 INJECTION, SOLUTION INTRAVENOUS at 12:34

## 2024-05-01 ASSESSMENT — LIFESTYLE VARIABLES
HOW OFTEN DO YOU HAVE A DRINK CONTAINING ALCOHOL: NEVER
HOW MANY STANDARD DRINKS CONTAINING ALCOHOL DO YOU HAVE ON A TYPICAL DAY: PATIENT DOES NOT DRINK

## 2024-05-01 NOTE — ED PROVIDER NOTES
Symmetric strength 5/5 in the upper and lower extremities bilaterally  Psychiatric: Normal Affect      NIH Stroke Scale/Score at time of initial evaluation:  1A: Level of Consciousness 0 - alert; keenly responsive   1B: Ask Month and Age 0 - answers both questions correctly   1C: Tell Patient To Open and Close Eyes, then Hand  Squeeze 0 - performs both tasks correctly   2: Test Horizontal Extraocular Movements 0 - normal   3: Test Visual Fields 0 - no visual loss   4: Test Facial Palsy 0 - normal symmetric movement   5A: Test Left Arm Motor Drift 0 - no drift, limb holds 90 (or 45) degrees for full 10 seconds   5B: Test Right Arm Motor Drift 0 - no drift, limb holds 90 (or 45) degrees for full 10 seconds   6A: Test Left Leg Motor Drift 0 - no drift; leg holds 30 degree position for full 5 seconds   6B: Test Right Leg Motor Drift 0 - no drift; leg holds 30 degree position for full 5 seconds   7: Test Limb Ataxia   (FNF/Heel-Shin) 0 - absent   8: Test Sensation 0 - normal; no sensory loss   9: Test Language/Aphasia 0 - no aphasia, normal   10: Test Dysarthria 0 - normal   11: Test Extinction/Inattention 0 - no abnormality   Total 0                 DIAGNOSTIC RESULTS   LABS:    Labs Reviewed   BASIC METABOLIC PANEL - Abnormal; Notable for the following components:       Result Value    Glucose 174 (*)     BUN 32 (*)     Creatinine 1.2 (*)     Est, Glom Filt Rate 46 (*)     All other components within normal limits   CBC WITH AUTO DIFFERENTIAL - Abnormal; Notable for the following components:    Lymphocytes % 17 (*)     Lymphocytes Absolute 0.95 (*)     Eosinophils Absolute 0.04 (*)     All other components within normal limits   TROPONIN - Abnormal; Notable for the following components:    Troponin, High Sensitivity 15 (*)     All other components within normal limits   TROPONIN - Abnormal; Notable for the following components:    Troponin, High Sensitivity 13 (*)     All other components within normal limits

## 2024-05-01 NOTE — DISCHARGE INSTR - COC
y+), IM, 30 mcg/0.3 mL 08/01/2022    COVID-19, PFIZER PURPLE top, DILUTE for use, (age 12 y+), 30mcg/0.3mL 03/03/2021, 03/24/2021, 09/30/2021    COVID-19, PFIZER, (2023-24 formula), (age 12y+), IM, 30mcg/0.3mL 11/02/2023    Influenza A (J2V9-27) Vaccine PF IM 12/03/2009    Influenza, FLUAD, (age 65 y+), Adjuvanted, 0.5mL 09/17/2021    Influenza, FLUZONE (age 65 y+), High Dose, 0.7mL 09/11/2020    Influenza, High Dose (Fluzone 65 yrs and older) 10/13/2016, 09/22/2018, 09/11/2019       Active Problems:  Patient Active Problem List   Diagnosis Code    Coronary artery disease involving native coronary artery of native heart without angina pectoris I25.10    Diabetes mellitus ([facility]) E11.9    Dyslipidemia E78.5    Essential hypertension I10    History of recurrent deep vein thrombosis (DVT) Z86.718    Statin intolerance Z78.9    LEDEZMA (dyspnea on exertion) R06.09    Chronic GERD K21.9    Hiatal hernia K44.9    Abnormal PFTs (pulmonary function tests) R94.2    ILD (interstitial lung disease) ([facility]) J84.9    History of pulmonary embolus (PE) Z86.711    Carotid stenosis, asymptomatic, bilateral I65.23    Splenic artery aneurysm ([facility]) I72.8       Isolation/Infection:   Isolation            No Isolation          Patient Infection Status       Infection Onset Added Last Indicated Last Indicated By Review Planned Expiration Resolved Resolved By    ESBL (Extended Spectrum Beta Lactamase)  08/10/15 08/10/15 Felisha Gray, RN        8/6/15 E. coli urine            Nurse Assessment:  Last Vital Signs: /84   Pulse 66   Temp 97.8 °F (36.6 °C) (Oral)   Resp 16   Ht 1.702 m (5' 7\")   Wt 83.9 kg (185 lb)   SpO2 97%   BMI 28.98 kg/m²     Last documented pain score (0-10 scale):    Last Weight:   Wt Readings from Last 1 Encounters:   05/01/24 83.9 kg (185 lb)     Mental Status:  {IP PT MENTAL STATUS:20030}    IV Access:  {[facility] IV ACCESS:[id number]}    Nursing Mobility/ADLs:  Walking   {[facility] ADLs:136013220}  Transfer  {[facility]

## 2024-07-15 ENCOUNTER — OFFICE VISIT (OUTPATIENT)
Dept: PULMONOLOGY | Age: 84
End: 2024-07-15
Payer: MEDICARE

## 2024-07-15 VITALS
DIASTOLIC BLOOD PRESSURE: 90 MMHG | WEIGHT: 185 LBS | RESPIRATION RATE: 16 BRPM | OXYGEN SATURATION: 97 % | TEMPERATURE: 97.6 F | BODY MASS INDEX: 29.03 KG/M2 | SYSTOLIC BLOOD PRESSURE: 187 MMHG | HEIGHT: 67 IN | HEART RATE: 71 BPM

## 2024-07-15 DIAGNOSIS — J47.9 BRONCHIECTASIS WITHOUT COMPLICATION (HCC): Primary | ICD-10-CM

## 2024-07-15 PROCEDURE — 1123F ACP DISCUSS/DSCN MKR DOCD: CPT | Performed by: INTERNAL MEDICINE

## 2024-07-15 PROCEDURE — 99215 OFFICE O/P EST HI 40 MIN: CPT | Performed by: INTERNAL MEDICINE

## 2024-07-15 PROCEDURE — 3080F DIAST BP >= 90 MM HG: CPT | Performed by: INTERNAL MEDICINE

## 2024-07-15 PROCEDURE — 3077F SYST BP >= 140 MM HG: CPT | Performed by: INTERNAL MEDICINE

## 2024-07-15 RX ORDER — EZETIMIBE 10 MG/1
1 TABLET ORAL
COMMUNITY

## 2024-07-15 RX ORDER — ALBUTEROL SULFATE 90 UG/1
2 AEROSOL, METERED RESPIRATORY (INHALATION) EVERY 6 HOURS PRN
Qty: 18 G | Refills: 3 | Status: SHIPPED | OUTPATIENT
Start: 2024-07-15

## 2024-07-15 RX ORDER — LATANOPROST 50 UG/ML
1 SOLUTION/ DROPS OPHTHALMIC NIGHTLY
COMMUNITY
Start: 2024-07-11

## 2024-07-15 NOTE — PROGRESS NOTES
1 year follow up to establish with new pulmonary provider; son with her today. Pt still having some shortness of breath with walking long distances. Albuterol prescribed and pt given Spacer device and instructed on use for inhaler. Plan for follow up in June 2025 after CT. Pt given follow up appt. Albuterol inhaler script to be sent to pharmacy.   
TABS tablet Take 1 tablet by mouth in the morning and 1 tablet in the evening.      vitamin D (CHOLECALCIFEROL) 25 MCG (1000 UT) TABS tablet Take 1 tablet by mouth daily      propranolol (INDERAL) 20 MG tablet 10mg in the morning and 20mg in the evening      Docusate Calcium (STOOL SOFTENER PO) Take by mouth as needed      nitroGLYCERIN (NITROSTAT) 0.4 MG SL tablet Place 1 tablet under the tongue every 5 minutes as needed for Chest pain 25 tablet 2     No current facility-administered medications on file prior to visit.         Seek immediate medical attn for any sx/signs which persist, recur, worsen, constitutional, new onset or further concerns. Patient education, benefits:risks, and precautions provided; clinical status + mgmt plan/ Rx explained in detail and in agreement. All questions or concerns answered to satisfaction and understood.   I spent 46 minutes completing this encounter. Total time included the following:  Independently interviewing the patient (HPI, ROS, PMH, PSH, FMH, SH, allergies and medications).  Independently performing a medical appropriate examination  Ordering medications, tests and/or procedures  Formulating the assessment/plan and reviewing the rationale for the above recommendations  Reviewing available records, results of all previously ordered testing/procedures and current problem list  Counseling/educating the patient  Coordinating care with other healthcare professionals  Documenting clinical information in the patient's electronic health records

## 2024-10-07 LAB
25(OH)D3 SERPL-MCNC: 51.2 NG/ML (ref 30–100)
ALBUMIN SERPL-MCNC: 4.3 G/DL (ref 3.5–5.2)
ALP SERPL-CCNC: 81 U/L (ref 35–104)
ALT SERPL-CCNC: 44 U/L (ref 0–32)
ANION GAP SERPL CALCULATED.3IONS-SCNC: 15 MMOL/L (ref 7–16)
AST SERPL-CCNC: 38 U/L (ref 0–31)
BILIRUB SERPL-MCNC: 1.4 MG/DL (ref 0–1.2)
BUN SERPL-MCNC: 24 MG/DL (ref 6–23)
CALCIUM SERPL-MCNC: 9.9 MG/DL (ref 8.6–10.2)
CHLORIDE SERPL-SCNC: 106 MMOL/L (ref 98–107)
CHOLEST SERPL-MCNC: 159 MG/DL
CO2 SERPL-SCNC: 22 MMOL/L (ref 22–29)
CREAT SERPL-MCNC: 1.2 MG/DL (ref 0.5–1)
ERYTHROCYTE [DISTWIDTH] IN BLOOD BY AUTOMATED COUNT: 14.9 % (ref 11.5–15)
GFR, ESTIMATED: 45 ML/MIN/1.73M2
GLUCOSE SERPL-MCNC: 123 MG/DL (ref 74–99)
HCT VFR BLD AUTO: 41 % (ref 34–48)
HDLC SERPL-MCNC: 36 MG/DL
HGB BLD-MCNC: 13 G/DL (ref 11.5–15.5)
LDLC SERPL CALC-MCNC: 98 MG/DL
MCH RBC QN AUTO: 28.6 PG (ref 26–35)
MCHC RBC AUTO-ENTMCNC: 31.7 G/DL (ref 32–34.5)
MCV RBC AUTO: 90.1 FL (ref 80–99.9)
PLATELET # BLD AUTO: 147 K/UL (ref 130–450)
PMV BLD AUTO: 11.2 FL (ref 7–12)
POTASSIUM SERPL-SCNC: 4.9 MMOL/L (ref 3.5–5)
PROT SERPL-MCNC: 7.5 G/DL (ref 6.4–8.3)
RBC # BLD AUTO: 4.55 M/UL (ref 3.5–5.5)
SODIUM SERPL-SCNC: 143 MMOL/L (ref 132–146)
TRIGL SERPL-MCNC: 125 MG/DL
VLDLC SERPL CALC-MCNC: 25 MG/DL
WBC OTHER # BLD: 5.4 K/UL (ref 4.5–11.5)

## 2024-10-14 ENCOUNTER — OFFICE VISIT (OUTPATIENT)
Dept: CARDIOLOGY CLINIC | Age: 84
End: 2024-10-14

## 2024-10-14 VITALS
DIASTOLIC BLOOD PRESSURE: 52 MMHG | RESPIRATION RATE: 16 BRPM | HEART RATE: 68 BPM | SYSTOLIC BLOOD PRESSURE: 108 MMHG | WEIGHT: 183 LBS | HEIGHT: 67 IN | BODY MASS INDEX: 28.72 KG/M2

## 2024-10-14 DIAGNOSIS — I48.0 PAROXYSMAL ATRIAL FIBRILLATION (HCC): Primary | ICD-10-CM

## 2024-10-14 DIAGNOSIS — I25.10 CORONARY ARTERY DISEASE INVOLVING NATIVE CORONARY ARTERY OF NATIVE HEART WITHOUT ANGINA PECTORIS: ICD-10-CM

## 2024-10-14 DIAGNOSIS — I10 ESSENTIAL HYPERTENSION: ICD-10-CM

## 2024-10-14 RX ORDER — BUDESONIDE, GLYCOPYRROLATE, AND FORMOTEROL FUMARATE 160; 9; 4.8 UG/1; UG/1; UG/1
AEROSOL, METERED RESPIRATORY (INHALATION) DAILY
COMMUNITY

## 2024-10-14 RX ORDER — LISINOPRIL 2.5 MG/1
2.5 TABLET ORAL DAILY
COMMUNITY
Start: 2024-08-28

## 2024-10-14 NOTE — PROGRESS NOTES
OFFICE VISIT     PRIMARY CARE PHYSICIAN:      Jostin Hodgson DO       ALLERGIES / SENSITIVITIES:        Allergies   Allergen Reactions    Tape [Adhesive Tape]      BANDAIDS          REVIEWED MEDICATIONS:        Current Outpatient Medications:     lisinopril (PRINIVIL;ZESTRIL) 2.5 MG tablet, Take 1 tablet by mouth daily, Disp: , Rfl:     Budeson-Glycopyrrol-Formoterol (BREZTRI AEROSPHERE) 160-9-4.8 MCG/ACT AERO, Inhale into the lungs daily, Disp: , Rfl:     ezetimibe (ZETIA) 10 MG tablet, Take 1 tablet by mouth Every Day, Disp: , Rfl:     latanoprost (XALATAN) 0.005 % ophthalmic solution, Place 1 drop into both eyes nightly, Disp: , Rfl:     albuterol sulfate HFA (PROVENTIL HFA) 108 (90 Base) MCG/ACT inhaler, Inhale 2 puffs into the lungs every 6 hours as needed for Wheezing, Disp: 18 g, Rfl: 3    Spacer/Aero-Holding Chambers TONIA, 1 Device by Does not apply route daily, Disp: 1 each, Rfl: 0    ELIQUIS 5 MG TABS tablet, Take 1 tablet by mouth in the morning and 1 tablet in the evening., Disp: , Rfl:     vitamin D (CHOLECALCIFEROL) 25 MCG (1000 UT) TABS tablet, Take 1 tablet by mouth daily, Disp: , Rfl:     propranolol (INDERAL) 20 MG tablet, 1 tablet 2 times daily, Disp: , Rfl:     Docusate Calcium (STOOL SOFTENER PO), Take by mouth as needed, Disp: , Rfl:     nitroGLYCERIN (NITROSTAT) 0.4 MG SL tablet, Place 1 tablet under the tongue every 5 minutes as needed for Chest pain, Disp: 25 tablet, Rfl: 2      S: REASON FOR VISIT:       Chief Complaint   Patient presents with    Coronary Artery Disease    6 Month Follow-Up          History of Present Illness:       Ofice Visit for follow up of CAD, HTN, SOB post-hospital f/u              83 yr old Gaby Chiang with history of CAD s/p PCI, HTN came for f/u visit   Started on Zetia 10mg - Not started yet.   No hospitalizations or surgeries since last visit   Compliant with all medications   Albert any exertional chest pain or short of breath   No palpitations, dizzy or

## 2024-11-06 RX ORDER — ALBUTEROL SULFATE 90 UG/1
INHALANT RESPIRATORY (INHALATION)
Qty: 18 EACH | Refills: 3 | Status: SHIPPED | OUTPATIENT
Start: 2024-11-06

## 2025-01-15 LAB
25(OH)D3 SERPL-MCNC: 45.7 NG/ML (ref 30–100)
ALBUMIN SERPL-MCNC: 4.2 G/DL (ref 3.5–5.2)
ALP SERPL-CCNC: 69 U/L (ref 35–104)
ALT SERPL-CCNC: 20 U/L (ref 0–32)
ANION GAP SERPL CALCULATED.3IONS-SCNC: 9 MMOL/L (ref 7–16)
AST SERPL-CCNC: 24 U/L (ref 0–31)
BILIRUB SERPL-MCNC: 1.3 MG/DL (ref 0–1.2)
BUN SERPL-MCNC: 26 MG/DL (ref 6–23)
CALCIUM SERPL-MCNC: 9.5 MG/DL (ref 8.6–10.2)
CHLORIDE SERPL-SCNC: 101 MMOL/L (ref 98–107)
CHOLEST SERPL-MCNC: 111 MG/DL
CO2 SERPL-SCNC: 29 MMOL/L (ref 22–29)
CREAT SERPL-MCNC: 1.1 MG/DL (ref 0.5–1)
ERYTHROCYTE [DISTWIDTH] IN BLOOD BY AUTOMATED COUNT: 14.7 % (ref 11.5–15)
GFR, ESTIMATED: 51 ML/MIN/1.73M2
GLUCOSE SERPL-MCNC: 114 MG/DL (ref 74–99)
HCT VFR BLD AUTO: 40.1 % (ref 34–48)
HDLC SERPL-MCNC: 34 MG/DL
HGB BLD-MCNC: 12.7 G/DL (ref 11.5–15.5)
LDLC SERPL CALC-MCNC: 55 MG/DL
MCH RBC QN AUTO: 29.1 PG (ref 26–35)
MCHC RBC AUTO-ENTMCNC: 31.7 G/DL (ref 32–34.5)
MCV RBC AUTO: 92 FL (ref 80–99.9)
PLATELET # BLD AUTO: 143 K/UL (ref 130–450)
PMV BLD AUTO: 11.4 FL (ref 7–12)
POTASSIUM SERPL-SCNC: 4.3 MMOL/L (ref 3.5–5)
PROT SERPL-MCNC: 7 G/DL (ref 6.4–8.3)
RBC # BLD AUTO: 4.36 M/UL (ref 3.5–5.5)
SODIUM SERPL-SCNC: 139 MMOL/L (ref 132–146)
TRIGL SERPL-MCNC: 109 MG/DL
VLDLC SERPL CALC-MCNC: 22 MG/DL
WBC OTHER # BLD: 5 K/UL (ref 4.5–11.5)

## 2025-01-20 ENCOUNTER — HOSPITAL ENCOUNTER (OUTPATIENT)
Dept: CT IMAGING | Age: 85
Discharge: HOME | End: 2025-01-22
Attending: INTERNAL MEDICINE
Payer: MEDICARE

## 2025-01-20 DIAGNOSIS — J47.9 BRONCHIECTASIS WITHOUT COMPLICATION (HCC): ICD-10-CM

## 2025-01-20 PROCEDURE — 71250 CT THORAX DX C-: CPT

## 2025-04-01 LAB
ALBUMIN SERPL-MCNC: 4.3 G/DL (ref 3.5–5.2)
ALP SERPL-CCNC: 73 U/L (ref 35–104)
ALT SERPL-CCNC: 22 U/L (ref 0–32)
ANION GAP SERPL CALCULATED.3IONS-SCNC: 17 MMOL/L (ref 7–16)
AST SERPL-CCNC: 26 U/L (ref 0–31)
BILIRUB SERPL-MCNC: 1.7 MG/DL (ref 0–1.2)
BUN SERPL-MCNC: 32 MG/DL (ref 6–23)
CALCIUM SERPL-MCNC: 9.8 MG/DL (ref 8.6–10.2)
CHLORIDE SERPL-SCNC: 105 MMOL/L (ref 98–107)
CHOLEST SERPL-MCNC: 146 MG/DL
CO2 SERPL-SCNC: 19 MMOL/L (ref 22–29)
CREAT SERPL-MCNC: 1.1 MG/DL (ref 0.5–1)
GFR, ESTIMATED: 47 ML/MIN/1.73M2
GLUCOSE SERPL-MCNC: 90 MG/DL (ref 74–99)
HDLC SERPL-MCNC: 37 MG/DL
LDLC SERPL CALC-MCNC: 88 MG/DL
POTASSIUM SERPL-SCNC: 4.7 MMOL/L (ref 3.5–5)
PROT SERPL-MCNC: 7.5 G/DL (ref 6.4–8.3)
SODIUM SERPL-SCNC: 141 MMOL/L (ref 132–146)
TRIGL SERPL-MCNC: 105 MG/DL
VLDLC SERPL CALC-MCNC: 21 MG/DL

## 2025-07-03 LAB
25(OH)D3 SERPL-MCNC: 47.7 NG/ML (ref 30–100)
ALBUMIN SERPL-MCNC: 3.9 G/DL (ref 3.5–5.2)
ALP SERPL-CCNC: 82 U/L (ref 35–104)
ALT SERPL-CCNC: 23 U/L (ref 0–35)
ANION GAP SERPL CALCULATED.3IONS-SCNC: 12 MMOL/L (ref 7–16)
AST SERPL-CCNC: 31 U/L (ref 0–35)
BILIRUB SERPL-MCNC: 1.2 MG/DL (ref 0–1.2)
BUN SERPL-MCNC: 26 MG/DL (ref 8–23)
CALCIUM SERPL-MCNC: 9.5 MG/DL (ref 8.8–10.2)
CHLORIDE SERPL-SCNC: 104 MMOL/L (ref 98–107)
CHOLEST SERPL-MCNC: 160 MG/DL
CO2 SERPL-SCNC: 22 MMOL/L (ref 22–29)
CREAT SERPL-MCNC: 1 MG/DL (ref 0.5–1)
ERYTHROCYTE [DISTWIDTH] IN BLOOD BY AUTOMATED COUNT: 15.1 % (ref 11.5–15)
GFR, ESTIMATED: 56 ML/MIN/1.73M2
GLUCOSE SERPL-MCNC: 122 MG/DL (ref 74–99)
HBA1C MFR BLD: 6.2 % (ref 4–5.6)
HCT VFR BLD AUTO: 35.5 % (ref 34–48)
HDLC SERPL-MCNC: 32 MG/DL
HGB BLD-MCNC: 11.3 G/DL (ref 11.5–15.5)
LDLC SERPL CALC-MCNC: 103 MG/DL
MCH RBC QN AUTO: 29.6 PG (ref 26–35)
MCHC RBC AUTO-ENTMCNC: 31.8 G/DL (ref 32–34.5)
MCV RBC AUTO: 92.9 FL (ref 80–99.9)
PLATELET # BLD AUTO: 156 K/UL (ref 130–450)
PMV BLD AUTO: 10.9 FL (ref 7–12)
POTASSIUM SERPL-SCNC: 4.7 MMOL/L (ref 3.5–5.1)
PROT SERPL-MCNC: 6.8 G/DL (ref 6.4–8.3)
RBC # BLD AUTO: 3.82 M/UL (ref 3.5–5.5)
SODIUM SERPL-SCNC: 138 MMOL/L (ref 136–145)
TRIGL SERPL-MCNC: 123 MG/DL
TSH SERPL DL<=0.05 MIU/L-ACNC: 4.01 UIU/ML (ref 0.27–4.2)
VLDLC SERPL CALC-MCNC: 25 MG/DL
WBC OTHER # BLD: 5.3 K/UL (ref 4.5–11.5)

## 2025-07-09 ENCOUNTER — OFFICE VISIT (OUTPATIENT)
Age: 85
End: 2025-07-09
Payer: MEDICARE

## 2025-07-09 VITALS
HEART RATE: 74 BPM | OXYGEN SATURATION: 97 % | RESPIRATION RATE: 18 BRPM | DIASTOLIC BLOOD PRESSURE: 58 MMHG | TEMPERATURE: 97.6 F | SYSTOLIC BLOOD PRESSURE: 127 MMHG

## 2025-07-09 DIAGNOSIS — M05.10 RHEUMATOID LUNG (HCC): Primary | ICD-10-CM

## 2025-07-09 PROCEDURE — 1123F ACP DISCUSS/DSCN MKR DOCD: CPT | Performed by: INTERNAL MEDICINE

## 2025-07-09 PROCEDURE — 99215 OFFICE O/P EST HI 40 MIN: CPT | Performed by: INTERNAL MEDICINE

## 2025-07-09 PROCEDURE — 1159F MED LIST DOCD IN RCRD: CPT | Performed by: INTERNAL MEDICINE

## 2025-07-09 PROCEDURE — 3074F SYST BP LT 130 MM HG: CPT | Performed by: INTERNAL MEDICINE

## 2025-07-09 PROCEDURE — 3078F DIAST BP <80 MM HG: CPT | Performed by: INTERNAL MEDICINE

## 2025-07-09 RX ORDER — FLUTICASONE PROPIONATE AND SALMETEROL 500; 50 UG/1; UG/1
1 POWDER RESPIRATORY (INHALATION) 2 TIMES DAILY
COMMUNITY
Start: 2025-05-05

## 2025-07-09 RX ORDER — FUROSEMIDE 20 MG/1
20 TABLET ORAL DAILY
Qty: 60 TABLET | Refills: 3 | Status: SHIPPED | OUTPATIENT
Start: 2025-07-09

## 2025-07-09 RX ORDER — PREDNISONE 20 MG/1
40 TABLET ORAL DAILY
Qty: 26 TABLET | Refills: 0 | Status: SHIPPED | OUTPATIENT
Start: 2025-07-09 | End: 2025-07-30

## 2025-07-09 RX ORDER — PANTOPRAZOLE SODIUM 40 MG/1
40 TABLET, DELAYED RELEASE ORAL
Qty: 90 TABLET | Refills: 1 | Status: SHIPPED | OUTPATIENT
Start: 2025-07-09

## 2025-07-09 NOTE — PROGRESS NOTES
PULMONARY  MEDICINE OFFICE FOLLOW UP       Gaby Chiang is a 84 y.o. 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     She has been having increasing LEDEZMA over the last several months . No cough ,no wheezing.       Current Baseline symptomatology-  Cough - moderate in severity , more in early am and gets better through the day , associated with whitish phlegm. No associated chest pain.  LEDEZMA - severity based on the level of exertion. Comes with exertion and relieved with rest. Associated occasionally with wheezing.   Wheezing - comes with exertion frequently  , mild to moderate in severity , relieved with rest .  She has not used albuterol yet         MMRC Dyspnea Scale  Grade Description of Breathlessness   0 I only get breathless with strenuous exercise.   1 I get short of breath when hurrying on level ground or walking up a slight hill.   2 On level ground, I walk slower than people of the same age because of breathlessness, or have to stop for breath when walking at my own pace.   3 I stop for breath after walking about 100 yards or after a few minutes on level ground.   4 I am too breathless to leave the house or I am breathless when dressing.     Mallampati Score- 2    Occupational Exposure - No occupational exposures to any form of Pneumotoxins  Smoking history -never smoker    Pets/ Birds - Does not have a prolonged exposure to any exotic birds.     Travel to endemic areas - None     Current medications- please see below.     Past Medical History   Past Medical History:   Diagnosis Date    Arthritis     OSTEO    CAD (coronary artery disease) 01/01/2007    STENTS    ASTRID (cerebral atherosclerosis)     Diabetes mellitus (HCC)     DVT (deep venous thrombosis) (HCC) X3 1999    HX OF    GERD (gastroesophageal reflux disease)     Glaucoma     BEGINNING    Hyperlipidemia     Hypertension     Hypertension

## 2025-07-09 NOTE — PROGRESS NOTES
Gaby is going to get a script for prednisone, lasix, and Protonix sent to the pharmacy and then follow up in 3 months.

## 2025-07-31 ENCOUNTER — TELEPHONE (OUTPATIENT)
Dept: CARDIOLOGY CLINIC | Age: 85
End: 2025-07-31

## 2025-07-31 NOTE — TELEPHONE ENCOUNTER
Called PT  to reschedule appointment on 8/8/25 at 3:30. L/M for pt to call office if new time of 8:45 doesn't work for her.

## 2025-08-01 ENCOUNTER — TELEPHONE (OUTPATIENT)
Dept: CARDIOLOGY CLINIC | Age: 85
End: 2025-08-01

## 2025-08-01 NOTE — TELEPHONE ENCOUNTER
I called in response to vm re: no reason stated- lm for pt to call my number to avoid delay in call back.

## 2025-08-01 NOTE — TELEPHONE ENCOUNTER
I called pt in response to vm re: lyly with appt info, 8/8/25 @ 8:45 am. Call office if you need anything else, thank you.

## 2025-08-04 ENCOUNTER — TELEPHONE (OUTPATIENT)
Dept: CARDIOLOGY CLINIC | Age: 85
End: 2025-08-04

## 2025-08-08 ENCOUNTER — OFFICE VISIT (OUTPATIENT)
Dept: CARDIOLOGY CLINIC | Age: 85
End: 2025-08-08
Payer: MEDICARE

## 2025-08-08 ENCOUNTER — TELEPHONE (OUTPATIENT)
Dept: CARDIOLOGY | Age: 85
End: 2025-08-08

## 2025-08-08 VITALS
DIASTOLIC BLOOD PRESSURE: 60 MMHG | HEIGHT: 67 IN | BODY MASS INDEX: 29.35 KG/M2 | RESPIRATION RATE: 18 BRPM | HEART RATE: 69 BPM | WEIGHT: 187 LBS | SYSTOLIC BLOOD PRESSURE: 118 MMHG

## 2025-08-08 DIAGNOSIS — I48.0 PAROXYSMAL ATRIAL FIBRILLATION (HCC): Primary | ICD-10-CM

## 2025-08-08 DIAGNOSIS — R94.31 ABNORMAL EKG: ICD-10-CM

## 2025-08-08 DIAGNOSIS — R06.02 SHORTNESS OF BREATH: ICD-10-CM

## 2025-08-08 DIAGNOSIS — Z86.711 HISTORY OF PULMONARY EMBOLUS (PE): ICD-10-CM

## 2025-08-08 DIAGNOSIS — Z78.9 STATIN INTOLERANCE: ICD-10-CM

## 2025-08-08 DIAGNOSIS — I10 ESSENTIAL HYPERTENSION: ICD-10-CM

## 2025-08-08 DIAGNOSIS — I25.10 CORONARY ARTERY DISEASE INVOLVING NATIVE CORONARY ARTERY OF NATIVE HEART WITHOUT ANGINA PECTORIS: ICD-10-CM

## 2025-08-08 PROCEDURE — 3078F DIAST BP <80 MM HG: CPT | Performed by: INTERNAL MEDICINE

## 2025-08-08 PROCEDURE — 99214 OFFICE O/P EST MOD 30 MIN: CPT | Performed by: INTERNAL MEDICINE

## 2025-08-08 PROCEDURE — 3074F SYST BP LT 130 MM HG: CPT | Performed by: INTERNAL MEDICINE

## 2025-08-08 PROCEDURE — 1159F MED LIST DOCD IN RCRD: CPT | Performed by: INTERNAL MEDICINE

## 2025-08-08 PROCEDURE — 1123F ACP DISCUSS/DSCN MKR DOCD: CPT | Performed by: INTERNAL MEDICINE

## 2025-08-08 PROCEDURE — 93000 ELECTROCARDIOGRAM COMPLETE: CPT | Performed by: INTERNAL MEDICINE

## 2025-08-08 PROCEDURE — G2211 COMPLEX E/M VISIT ADD ON: HCPCS | Performed by: INTERNAL MEDICINE

## 2025-08-08 RX ORDER — REGADENOSON 0.08 MG/ML
0.4 INJECTION, SOLUTION INTRAVENOUS
OUTPATIENT
Start: 2025-08-08

## 2025-08-08 RX ORDER — ALIROCUMAB 75 MG/ML
75 INJECTION, SOLUTION SUBCUTANEOUS
COMMUNITY

## 2025-08-29 ENCOUNTER — TELEPHONE (OUTPATIENT)
Dept: CARDIOLOGY | Age: 85
End: 2025-08-29

## 2025-09-01 ENCOUNTER — HOSPITAL ENCOUNTER (EMERGENCY)
Age: 85
Discharge: HOME | End: 2025-09-01
Payer: MEDICARE

## 2025-09-01 VITALS
DIASTOLIC BLOOD PRESSURE: 84 MMHG | TEMPERATURE: 97.9 F | BODY MASS INDEX: 28.98 KG/M2 | RESPIRATION RATE: 20 BRPM | OXYGEN SATURATION: 96 % | WEIGHT: 185 LBS | SYSTOLIC BLOOD PRESSURE: 125 MMHG | HEART RATE: 82 BPM

## 2025-09-01 DIAGNOSIS — N30.01 ACUTE CYSTITIS WITH HEMATURIA: Primary | ICD-10-CM

## 2025-09-01 LAB
BACTERIA URNS QL MICRO: ABNORMAL
BILIRUB UR QL STRIP: NEGATIVE
CLARITY UR: ABNORMAL
COLOR UR: YELLOW
GLUCOSE UR STRIP-MCNC: NEGATIVE MG/DL
HGB UR QL STRIP.AUTO: ABNORMAL
KETONES UR STRIP-MCNC: NEGATIVE MG/DL
LEUKOCYTE ESTERASE UR QL STRIP: ABNORMAL
NITRITE UR QL STRIP: POSITIVE
PH UR STRIP: 6 [PH] (ref 5–8)
PROT UR STRIP-MCNC: 100 MG/DL
RBC #/AREA URNS HPF: ABNORMAL /HPF
SP GR UR STRIP: 1.02 (ref 1–1.03)
UROBILINOGEN UR STRIP-ACNC: 2 EU/DL (ref 0–1)
WBC #/AREA URNS HPF: ABNORMAL /HPF

## 2025-09-01 PROCEDURE — 87086 URINE CULTURE/COLONY COUNT: CPT

## 2025-09-01 PROCEDURE — 99211 OFF/OP EST MAY X REQ PHY/QHP: CPT

## 2025-09-01 PROCEDURE — 81001 URINALYSIS AUTO W/SCOPE: CPT

## 2025-09-01 PROCEDURE — 87088 URINE BACTERIA CULTURE: CPT

## 2025-09-01 RX ORDER — CEFDINIR 300 MG/1
300 CAPSULE ORAL 2 TIMES DAILY
Qty: 14 CAPSULE | Refills: 0 | Status: SHIPPED | OUTPATIENT
Start: 2025-09-01 | End: 2025-09-08

## 2025-09-01 ASSESSMENT — PAIN - FUNCTIONAL ASSESSMENT: PAIN_FUNCTIONAL_ASSESSMENT: 0-10

## 2025-09-01 ASSESSMENT — PAIN SCALES - GENERAL: PAINLEVEL_OUTOF10: 0

## 2025-09-03 ENCOUNTER — HOSPITAL ENCOUNTER (OUTPATIENT)
Dept: CARDIOLOGY | Age: 85
Discharge: HOME | End: 2025-09-05
Payer: MEDICARE

## 2025-09-03 VITALS
HEIGHT: 67 IN | WEIGHT: 187 LBS | BODY MASS INDEX: 29.35 KG/M2 | DIASTOLIC BLOOD PRESSURE: 82 MMHG | RESPIRATION RATE: 16 BRPM | SYSTOLIC BLOOD PRESSURE: 158 MMHG | HEART RATE: 57 BPM

## 2025-09-03 DIAGNOSIS — I25.10 CORONARY ARTERY DISEASE INVOLVING NATIVE CORONARY ARTERY OF NATIVE HEART WITHOUT ANGINA PECTORIS: ICD-10-CM

## 2025-09-03 DIAGNOSIS — I48.0 PAROXYSMAL ATRIAL FIBRILLATION (HCC): ICD-10-CM

## 2025-09-03 DIAGNOSIS — R94.31 ABNORMAL EKG: ICD-10-CM

## 2025-09-03 DIAGNOSIS — I10 ESSENTIAL HYPERTENSION: ICD-10-CM

## 2025-09-03 DIAGNOSIS — R06.02 SHORTNESS OF BREATH: ICD-10-CM

## 2025-09-03 LAB
MICROORGANISM SPEC CULT: ABNORMAL
SPECIMEN DESCRIPTION: ABNORMAL

## 2025-09-03 PROCEDURE — 93017 CV STRESS TEST TRACING ONLY: CPT

## 2025-09-03 PROCEDURE — 6360000002 HC RX W HCPCS: Performed by: INTERNAL MEDICINE

## 2025-09-03 PROCEDURE — A9502 TC99M TETROFOSMIN: HCPCS | Performed by: INTERNAL MEDICINE

## 2025-09-03 PROCEDURE — 2500000003 HC RX 250 WO HCPCS: Performed by: INTERNAL MEDICINE

## 2025-09-03 PROCEDURE — 3430000000 HC RX DIAGNOSTIC RADIOPHARMACEUTICAL: Performed by: INTERNAL MEDICINE

## 2025-09-03 RX ORDER — REGADENOSON 0.08 MG/ML
0.4 INJECTION, SOLUTION INTRAVENOUS
Status: COMPLETED | OUTPATIENT
Start: 2025-09-03 | End: 2025-09-03

## 2025-09-03 RX ORDER — ASPIRIN 81 MG/1
81 TABLET ORAL DAILY
COMMUNITY

## 2025-09-03 RX ORDER — SODIUM CHLORIDE 0.9 % (FLUSH) 0.9 %
10 SYRINGE (ML) INJECTION PRN
Status: DISCONTINUED | OUTPATIENT
Start: 2025-09-03 | End: 2025-09-06 | Stop reason: HOSPADM

## 2025-09-03 RX ADMIN — SODIUM CHLORIDE, PRESERVATIVE FREE 10 ML: 5 INJECTION INTRAVENOUS at 07:48

## 2025-09-03 RX ADMIN — TETROFOSMIN 35.8 MILLICURIE: 0.23 INJECTION, POWDER, LYOPHILIZED, FOR SOLUTION INTRAVENOUS at 09:23

## 2025-09-03 RX ADMIN — REGADENOSON 0.4 MG: 0.08 INJECTION, SOLUTION INTRAVENOUS at 09:23

## 2025-09-03 RX ADMIN — TETROFOSMIN 10.5 MILLICURIE: 0.23 INJECTION, POWDER, LYOPHILIZED, FOR SOLUTION INTRAVENOUS at 07:48

## 2025-09-03 RX ADMIN — SODIUM CHLORIDE, PRESERVATIVE FREE 10 ML: 5 INJECTION INTRAVENOUS at 09:23

## 2025-09-04 LAB
ECHO BSA: 2 M2
NUC STRESS EJECTION FRACTION: 70 %
STRESS BASELINE DIAS BP: 82 MMHG
STRESS BASELINE HR: 58 BPM
STRESS BASELINE SYS BP: 158 MMHG
STRESS ESTIMATED WORKLOAD: 1 METS
STRESS PEAK DIAS BP: 66 MMHG
STRESS PEAK SYS BP: 149 MMHG
STRESS PERCENT HR ACHIEVED: 60 %
STRESS POST PEAK HR: 81 BPM
STRESS RATE PRESSURE PRODUCT: NORMAL BPM*MMHG
STRESS TARGET HR: 135 BPM
TID: 0.86

## 2025-09-04 PROCEDURE — 93018 CV STRESS TEST I&R ONLY: CPT | Performed by: INTERNAL MEDICINE

## 2025-09-04 PROCEDURE — 78452 HT MUSCLE IMAGE SPECT MULT: CPT | Performed by: INTERNAL MEDICINE

## 2025-09-04 PROCEDURE — 93016 CV STRESS TEST SUPVJ ONLY: CPT | Performed by: INTERNAL MEDICINE

## 2025-09-05 ENCOUNTER — TELEPHONE (OUTPATIENT)
Dept: CARDIOLOGY CLINIC | Age: 85
End: 2025-09-05